# Patient Record
Sex: MALE | Race: WHITE | Employment: FULL TIME | URBAN - METROPOLITAN AREA
[De-identification: names, ages, dates, MRNs, and addresses within clinical notes are randomized per-mention and may not be internally consistent; named-entity substitution may affect disease eponyms.]

---

## 2018-09-26 ENCOUNTER — HOSPITAL ENCOUNTER (OUTPATIENT)
Dept: CT IMAGING | Age: 38
Discharge: HOME | End: 2018-09-26
Attending: NURSE PRACTITIONER
Payer: COMMERCIAL

## 2018-09-26 DIAGNOSIS — R35.0 URINARY FREQUENCY: ICD-10-CM

## 2018-09-26 DIAGNOSIS — R10.9 ACUTE LEFT FLANK PAIN: ICD-10-CM

## 2018-09-26 DIAGNOSIS — R31.29 OTHER MICROSCOPIC HEMATURIA: ICD-10-CM

## 2018-09-26 DIAGNOSIS — R82.998 DARK URINE: ICD-10-CM

## 2018-09-26 DIAGNOSIS — R10.32 LEFT LOWER QUADRANT PAIN: ICD-10-CM

## 2018-09-26 PROCEDURE — 74176 CT ABD & PELVIS W/O CONTRAST: CPT

## 2018-09-28 PROBLEM — N21.9 CALCULUS OF LOWER URINARY TRACT: Status: ACTIVE | Noted: 2018-09-28

## 2019-10-28 PROBLEM — J30.1 SEASONAL ALLERGIC RHINITIS DUE TO POLLEN: Status: ACTIVE | Noted: 2019-10-28

## 2019-10-28 PROBLEM — F41.1 GAD (GENERALIZED ANXIETY DISORDER): Status: ACTIVE | Noted: 2019-10-28

## 2019-10-28 PROBLEM — J30.89 ENVIRONMENTAL AND SEASONAL ALLERGIES: Status: ACTIVE | Noted: 2019-10-28

## 2020-04-20 PROBLEM — E78.2 MIXED HYPERLIPIDEMIA: Status: ACTIVE | Noted: 2020-04-20

## 2020-09-18 PROBLEM — Z23 ENCOUNTER FOR IMMUNIZATION: Status: ACTIVE | Noted: 2020-09-18

## 2020-09-18 PROBLEM — E66.3 OVERWEIGHT: Status: ACTIVE | Noted: 2020-09-18

## 2020-10-18 PROBLEM — Z23 ENCOUNTER FOR IMMUNIZATION: Status: RESOLVED | Noted: 2020-09-18 | Resolved: 2020-10-18

## 2021-10-11 PROBLEM — N52.01 ERECTILE DYSFUNCTION DUE TO ARTERIAL INSUFFICIENCY: Status: ACTIVE | Noted: 2021-10-11

## 2022-03-18 PROBLEM — E78.2 MIXED HYPERLIPIDEMIA: Status: ACTIVE | Noted: 2020-04-20

## 2022-03-18 PROBLEM — N52.01 ERECTILE DYSFUNCTION DUE TO ARTERIAL INSUFFICIENCY: Status: ACTIVE | Noted: 2021-10-11

## 2022-03-18 PROBLEM — F41.1 GAD (GENERALIZED ANXIETY DISORDER): Status: ACTIVE | Noted: 2019-10-28

## 2022-03-18 PROBLEM — E66.3 OVERWEIGHT: Status: ACTIVE | Noted: 2020-09-18

## 2022-03-19 PROBLEM — N21.9 CALCULUS OF LOWER URINARY TRACT: Status: ACTIVE | Noted: 2018-09-28

## 2022-03-19 PROBLEM — J30.89 ENVIRONMENTAL AND SEASONAL ALLERGIES: Status: ACTIVE | Noted: 2019-10-28

## 2022-03-19 PROBLEM — J30.1 SEASONAL ALLERGIC RHINITIS DUE TO POLLEN: Status: ACTIVE | Noted: 2019-10-28

## 2022-06-01 ENCOUNTER — TELEMEDICINE (OUTPATIENT)
Dept: FAMILY MEDICINE CLINIC | Facility: CLINIC | Age: 42
End: 2022-06-01
Payer: COMMERCIAL

## 2022-06-01 DIAGNOSIS — U07.1 COVID-19: Primary | ICD-10-CM

## 2022-06-01 PROCEDURE — 99213 OFFICE O/P EST LOW 20 MIN: CPT | Performed by: FAMILY MEDICINE

## 2022-06-01 RX ORDER — DOXYCYCLINE HYCLATE 100 MG
100 TABLET ORAL 2 TIMES DAILY
Qty: 14 TABLET | Refills: 0 | Status: SHIPPED | OUTPATIENT
Start: 2022-06-01 | End: 2022-06-08

## 2022-06-01 RX ORDER — DEXAMETHASONE 6 MG/1
6 TABLET ORAL
Qty: 6 TABLET | Refills: 0 | Status: SHIPPED | OUTPATIENT
Start: 2022-06-01 | End: 2022-06-07

## 2022-06-01 RX ORDER — ALBUTEROL SULFATE 90 UG/1
2 AEROSOL, METERED RESPIRATORY (INHALATION) 4 TIMES DAILY PRN
Qty: 18 G | Refills: 1 | Status: SHIPPED | OUTPATIENT
Start: 2022-06-01

## 2022-06-01 ASSESSMENT — ENCOUNTER SYMPTOMS
RHINORRHEA: 1
STRIDOR: 0
CHEST TIGHTNESS: 1
EYE REDNESS: 0
EYE PAIN: 0
SINUS PAIN: 0
BACK PAIN: 0
EYE ITCHING: 0
VOMITING: 0
SHORTNESS OF BREATH: 0
ABDOMINAL PAIN: 0
COUGH: 1
SINUS PRESSURE: 0
DIARRHEA: 0
WHEEZING: 0
ABDOMINAL DISTENTION: 0
COLOR CHANGE: 0
NAUSEA: 0
CONSTIPATION: 0
EYE DISCHARGE: 0
BLOOD IN STOOL: 0
FACIAL SWELLING: 0
SORE THROAT: 0

## 2022-06-01 NOTE — PROGRESS NOTES
Clare Otero is a 42 y.o. male who was seen by synchronous (real-time) audio-video technology on 6/1/2022 for No chief complaint on file. Subjective:   Mr. Christine Chamberlain is a 41 y/o M that c/o 3 day h/o productive cough with mildly purulent sputum, burning in chest with breathing, sneezing, clear rhinorrhea, loss of taste, fatigue. He took at home covid test last night that was positive. He has alerted his co-workers that he interacted with yesterday. He denies any significant SOB, fever/chills. Prior to Admission medications    Medication Sig Start Date End Date Taking? Authorizing Provider   dexamethasone (DECADRON) 6 MG tablet Take 1 tablet by mouth daily (with breakfast) for 6 days 6/1/22 6/7/22 Yes Marychuy Handy III, MD   doxycycline hyclate (VIBRA-TABS) 100 MG tablet Take 1 tablet by mouth 2 times daily for 7 days 6/1/22 6/8/22 Yes Marychuy Handy III, MD   albuterol sulfate HFA (VENTOLIN HFA) 108 (90 Base) MCG/ACT inhaler Inhale 2 puffs into the lungs 4 times daily as needed for Wheezing 6/1/22  Yes Debora Downey III, MD   buPROPion (WELLBUTRIN XL) 300 MG extended release tablet Take 300 mg by mouth 10/11/21   Ar Automatic Reconciliation   candesartan (ATACAND) 16 MG tablet Take 16 mg by mouth daily 10/11/21   Ar Automatic Reconciliation   fluticasone (FLONASE) 50 MCG/ACT nasal spray USE 1 SPRAY IN BOTH NOSTRILS ONCE A DAY 10/11/21   Ar Automatic Reconciliation   sildenafil (VIAGRA) 25 MG tablet Take 25 mg by mouth as needed 10/11/21   Ar Automatic Reconciliation         Review of Systems   Constitutional: Positive for fatigue. Negative for activity change, appetite change, chills, diaphoresis, fever and unexpected weight change. HENT: Positive for rhinorrhea. Negative for congestion, ear discharge, ear pain, facial swelling, hearing loss, mouth sores, nosebleeds, postnasal drip, sinus pressure, sinus pain, sore throat and tinnitus.     Eyes: Negative for pain, discharge, redness, itching and visual disturbance. Respiratory: Positive for cough and chest tightness. Negative for shortness of breath, wheezing and stridor. Cardiovascular: Negative for chest pain, palpitations and leg swelling. Gastrointestinal: Negative for abdominal distention, abdominal pain, blood in stool, constipation, diarrhea, nausea and vomiting. Endocrine: Negative for cold intolerance, heat intolerance, polydipsia, polyphagia and polyuria. Genitourinary: Negative for decreased urine volume, difficulty urinating, dysuria, flank pain, frequency, hematuria and urgency. Musculoskeletal: Negative for arthralgias, back pain, gait problem, joint swelling, myalgias and neck pain. Skin: Negative for color change, pallor, rash and wound. Neurological: Negative for dizziness, tremors, seizures, syncope, facial asymmetry, speech difficulty, weakness, light-headedness, numbness and headaches. Psychiatric/Behavioral: Negative for agitation, behavioral problems, confusion, hallucinations, self-injury, sleep disturbance and suicidal ideas. The patient is not nervous/anxious. Objective:   No flowsheet data found.      [INSTRUCTIONS:  \"[x]\" Indicates a positive item  \"[]\" Indicates a negative item  -- DELETE ALL ITEMS NOT EXAMINED]    Constitutional: [x] Appears well-developed and well-nourished [x] No apparent distress      [] Abnormal -     Mental status: [x] Alert and awake  [x] Oriented to person/place/time [x] Able to follow commands    [] Abnormal -     Eyes:   EOM    [x]  Normal    [] Abnormal -   Sclera  [x]  Normal    [] Abnormal -          Discharge [x]  None visible   [] Abnormal -     HENT: [x] Normocephalic, atraumatic  [] Abnormal -       External Ears [x] Normal  [] Abnormal -    Neck: [x] No visualized mass [] Abnormal -     Pulmonary/Chest: [x] Respiratory effort normal   [x] No visualized signs of difficulty breathing or respiratory distress        [] Abnormal -      Musculoskeletal:   [] Normal gait with no signs of ataxia         [x] Normal range of motion of neck        [] Abnormal -     Neurological:        [x] No Facial Asymmetry (Cranial nerve 7 motor function) (limited exam due to video visit)          [x] No gaze palsy        [] Abnormal -          Skin:        [x] No significant exanthematous lesions or discoloration noted on facial skin         [] Abnormal -            Psychiatric:       [x] Normal Affect [] Abnormal -        [x] No Hallucinations    Other pertinent observable physical exam findings:-    Diagnoses and all orders for this visit:    COVID-19  -     dexamethasone (DECADRON) 6 MG tablet; Take 1 tablet by mouth daily (with breakfast) for 6 days  -     doxycycline hyclate (VIBRA-TABS) 100 MG tablet; Take 1 tablet by mouth 2 times daily for 7 days  -     albuterol sulfate HFA (VENTOLIN HFA) 108 (90 Base) MCG/ACT inhaler; Inhale 2 puffs into the lungs 4 times daily as needed for Wheezing           COVID-19   Recently recovered from sinusitis-finished Z-josefina. Will start doxy + dexamethasone + albuterol given pulmonary symptoms. Encouraged to quarantine for 5 days since s/s onset and no fever for 24 hrs without antipyretics; isolate from negative household contacts and wear mask even in home if pt must be in same room with negative contact, increase oral fluids; rest; notify all recent contacts of symptoms and need to quarantine and test; take any rx'd medications as rx'd; notify office with any SOB or if severe, go to ER. Encouraged to start Vit D3 5000 I/U daily if not already receiving supplementation, Vit C 2000 mg and Zince 100 mg daily x 10 days. We discussed the expected course, resolution and complications of the diagnosis(es) in detail. Medication risks, benefits, costs, interactions, and alternatives were discussed as indicated. I advised him to contact the office if his condition worsens, changes or fails to improve as anticipated.  He expressed understanding with the diagnosis(es) and plan. Iris Flores, was evaluated through a synchronous (real-time) audio-video encounter. The patient (or guardian if applicable) is aware that this is a billable service. Verbal consent to proceed has been obtained within the past 12 months. The visit was conducted pursuant to the emergency declaration under the [address] [place] [address] authority and the [employer] and [employer] General Act. Patient identification was verified, and a caregiver was present when appropriate. The patient was located in a state where the provider was credentialed to provide care. This visit was completely virtually using doxy. edward Li MD

## 2022-06-01 NOTE — ASSESSMENT & PLAN NOTE
Recently recovered from sinusitis-finished Z-josefina. Will start doxy + dexamethasone + albuterol given pulmonary symptoms. Encouraged to quarantine for 5 days since s/s onset and no fever for 24 hrs without antipyretics; isolate from negative household contacts and wear mask even in home if pt must be in same room with negative contact, increase oral fluids; rest; notify all recent contacts of symptoms and need to quarantine and test; take any rx'd medications as rx'd; notify office with any SOB or if severe, go to ER. Encouraged to start Vit D3 5000 I/U daily if not already receiving supplementation, Vit C 2000 mg and Zince 100 mg daily x 10 days.

## 2022-06-06 ENCOUNTER — TELEPHONE (OUTPATIENT)
Dept: FAMILY MEDICINE CLINIC | Facility: CLINIC | Age: 42
End: 2022-06-06

## 2022-06-06 NOTE — TELEPHONE ENCOUNTER
Please advise regarding pt's mychart message: \"Hello,     I have a couple of questions in regards to the Covid case I have snd  work note. Cely Arcos started Saturday doing some laundry just to do something and yesterday walked around the grocery store yesterday for about an 45 min to get my stamina up and once I was done with that I was worth nothing all day.  My o2 dropped to 91 and became congested around 6pm I then took my inhaler.  I rested for the rest of the night and o2 improved.  Today I have been resting and feel sore from walking yesterday. What I have seen is I get more congested in the evenings and I take one puff of the inhaler.  My other concern is I work for [employer] as an  and I walk daily 5 plus miles. Currently I supervise the outdoor garden area and there are limited sit down activities to do in that role. Would be it be the best to increase my walking every other day and a return to work date?      Thanks    Jennifer Herron \"

## 2022-06-07 ENCOUNTER — OFFICE VISIT (OUTPATIENT)
Dept: FAMILY MEDICINE CLINIC | Facility: CLINIC | Age: 42
End: 2022-06-07
Payer: COMMERCIAL

## 2022-06-07 VITALS
WEIGHT: 208 LBS | OXYGEN SATURATION: 98 % | HEIGHT: 71 IN | BODY MASS INDEX: 29.12 KG/M2 | DIASTOLIC BLOOD PRESSURE: 78 MMHG | SYSTOLIC BLOOD PRESSURE: 110 MMHG

## 2022-06-07 DIAGNOSIS — R06.02 SHORTNESS OF BREATH: ICD-10-CM

## 2022-06-07 DIAGNOSIS — U07.1 COVID-19: Primary | ICD-10-CM

## 2022-06-07 PROCEDURE — 99214 OFFICE O/P EST MOD 30 MIN: CPT | Performed by: FAMILY MEDICINE

## 2022-06-07 ASSESSMENT — ENCOUNTER SYMPTOMS
BACK PAIN: 0
SINUS PAIN: 0
COUGH: 1
EYE ITCHING: 0
ABDOMINAL PAIN: 0
BLOOD IN STOOL: 0
EYE DISCHARGE: 0
COLOR CHANGE: 0
FACIAL SWELLING: 0
STRIDOR: 0
RHINORRHEA: 0
CONSTIPATION: 0
SHORTNESS OF BREATH: 1
DIARRHEA: 0
SORE THROAT: 0
NAUSEA: 0
ABDOMINAL DISTENTION: 0
SINUS PRESSURE: 0
EYE REDNESS: 0
WHEEZING: 0
EYE PAIN: 0
CHEST TIGHTNESS: 0
VOMITING: 0

## 2022-06-07 NOTE — PROGRESS NOTES
[address] [place] [address]  _______________________________________  Jeffrey Swain MD                 [address] [place] [address],  [address]. [place], [place]                     [address]                                                                                    Phone: [phone]                                                                                    Fax: [phone]    Devi Gonzalez. is a 42 y.o. male who is seen for evaluation of   Chief Complaint   Patient presents with    Shortness of Breath     Pt tested positive for covid 5/31 and still has shortness of breath, fatigue, and a cough that make walking around the house difficult. He is concerned about going back to work and being able to do all of his work duties. He works at [employer] and walks around 15 miles a day. HPI:   Pt is seen today for JUSTIN and SOB upon laying down in bed at night, that started since his positive diagnosis of covid on 5/31. Pt continues to have fatigue, and a mostly dry cough with occasional clear sputum production. He has noticed these s/s while walking in his home. He denies any dizziness, CP, productive cough. Pt was given albuterol inhaler at the time of diagnosis, but has only used it prior to bed, which improved his SOB. He is concerned about going back to work and being able to do all of his work duties. He works at [employer] and walks around 15 miles a day. Review of Systems:  Review of Systems   Constitutional: Positive for fatigue. Negative for activity change, appetite change, chills, diaphoresis, fever and unexpected weight change. HENT: Negative for congestion, ear discharge, ear pain, facial swelling, hearing loss, mouth sores, nosebleeds, postnasal drip, rhinorrhea, sinus pressure, sinus pain, sore throat and tinnitus. Eyes: Negative for pain, discharge, redness, itching and visual disturbance. Respiratory: Positive for cough and shortness of breath.  Negative for chest tightness, wheezing and stridor. Cardiovascular: Negative for chest pain, palpitations and leg swelling. Gastrointestinal: Negative for abdominal distention, abdominal pain, blood in stool, constipation, diarrhea, nausea and vomiting. Endocrine: Negative for cold intolerance, heat intolerance, polydipsia, polyphagia and polyuria. Genitourinary: Negative for decreased urine volume, difficulty urinating, dysuria, flank pain, frequency, hematuria and urgency. Musculoskeletal: Negative for arthralgias, back pain, gait problem, joint swelling, myalgias and neck pain. Skin: Negative for color change, pallor, rash and wound. Neurological: Negative for dizziness, tremors, seizures, syncope, facial asymmetry, speech difficulty, weakness, light-headedness, numbness and headaches. Psychiatric/Behavioral: Negative for agitation, behavioral problems, confusion, hallucinations, self-injury, sleep disturbance and suicidal ideas. The patient is not nervous/anxious. History:  Past Medical History:   Diagnosis Date    JAZIEL (generalized anxiety disorder)     History of pneumonia     left upper lobe    Hypertension     Kidney stone        History reviewed. No pertinent surgical history. Family History   Problem Relation Age of Onset    Hypertension Father     Hypertension Paternal Grandfather     Heart Attack Paternal Grandfather 64    Depression Father     High Cholesterol Father     Alcohol Abuse Mother        Social History     Tobacco Use    Smoking status: Never Smoker    Smokeless tobacco: Former User   Substance Use Topics    Alcohol use:  Yes     Alcohol/week: 1.0 standard drink       Allergies   Allergen Reactions    Codeine Nausea And Vomiting       Current Outpatient Medications   Medication Sig Dispense Refill    dexamethasone (DECADRON) 6 MG tablet Take 1 tablet by mouth daily (with breakfast) for 6 days 6 tablet 0    doxycycline hyclate (VIBRA-TABS) 100 MG tablet Take 1 tablet by mouth 2 times daily for 7 days 14 tablet 0    albuterol sulfate HFA (VENTOLIN HFA) 108 (90 Base) MCG/ACT inhaler Inhale 2 puffs into the lungs 4 times daily as needed for Wheezing 18 g 1    buPROPion (WELLBUTRIN XL) 300 MG extended release tablet Take 300 mg by mouth      candesartan (ATACAND) 16 MG tablet Take 16 mg by mouth daily      fluticasone (FLONASE) 50 MCG/ACT nasal spray USE 1 SPRAY IN BOTH NOSTRILS ONCE A DAY      sildenafil (VIAGRA) 25 MG tablet Take 25 mg by mouth as needed       No current facility-administered medications for this visit. Vitals:    /78 (Site: Left Upper Arm, Position: Sitting, Cuff Size: Large Adult)   Ht 5' 11\" (1.803 m)   Wt 208 lb (94.3 kg)   SpO2 98%   BMI 29.01 kg/m²     Physical Exam:  Physical Exam  Vitals reviewed. Constitutional:       General: He is not in acute distress. Appearance: Normal appearance. He is not ill-appearing. HENT:      Head: Normocephalic and atraumatic. Right Ear: Tympanic membrane and ear canal normal. There is no impacted cerumen. Left Ear: Tympanic membrane and ear canal normal. There is no impacted cerumen. Nose: No congestion or rhinorrhea. Mouth/Throat:      Mouth: Mucous membranes are moist.      Pharynx: No oropharyngeal exudate or posterior oropharyngeal erythema. Eyes:      General: No scleral icterus. Right eye: No discharge. Left eye: No discharge. Extraocular Movements: Extraocular movements intact. Conjunctiva/sclera: Conjunctivae normal.      Pupils: Pupils are equal, round, and reactive to light. Cardiovascular:      Rate and Rhythm: Normal rate and regular rhythm. Pulses: Normal pulses. Heart sounds: No murmur heard. No friction rub. No gallop. Pulmonary:      Effort: Pulmonary effort is normal. No respiratory distress. Breath sounds: No stridor. No wheezing, rhonchi or rales. Chest:      Chest wall: No tenderness.    Abdominal:      General: Abdomen is flat. Bowel sounds are normal. There is no distension. Palpations: Abdomen is soft. There is no mass. Tenderness: There is no abdominal tenderness. There is no right CVA tenderness or left CVA tenderness. Musculoskeletal:         General: No tenderness. Normal range of motion. Cervical back: Normal range of motion and neck supple. No rigidity or tenderness. Right lower leg: No edema. Left lower leg: No edema. Lymphadenopathy:      Cervical: No cervical adenopathy. Skin:     General: Skin is warm and dry. Capillary Refill: Capillary refill takes less than 2 seconds. Findings: No lesion or rash. Neurological:      General: No focal deficit present. Mental Status: He is alert and oriented to person, place, and time. Mental status is at baseline. Sensory: No sensory deficit. Motor: No weakness. Coordination: Coordination normal.      Gait: Gait normal.   Psychiatric:         Mood and Affect: Mood normal.         Behavior: Behavior normal.         Thought Content: Thought content normal.         Judgment: Judgment normal.         Assessment/Plan:     ICD-10-CM    1. COVID-19  U07.1    2. Shortness of breath  R06.02         Problem List        Other    COVID-19 - Primary     Reassurance given that his s/s are consistent with covid-19 and may continue for several weeks after initial recovery from the virus. He has completed his course of dexamethasone and will be finishing abx today. Encouraged to use q 4 hrs prn and not just at bedtime. May consider adding inhaled corticosteroid if this does not improve over the next 7 days. Letter completed to extend work absence for an additional 7 days. Pts have show quite remarkable improvement in pulmonary endurance with post-covid pulmonary rehab. May consider if no improvement in 7 days.           Relevant Medications    dexamethasone (DECADRON) 6 MG tablet    doxycycline hyclate (VIBRA-TABS) 100 MG tablet albuterol sulfate HFA (VENTOLIN HFA) 108 (90 Base) MCG/ACT inhaler         I spent >30 minutes preparing to see patient (including chart review and preparation), obtaining and/or reviewing additional medical history, performing a physical exam and evaluation, documenting clinical information in the electronic health record, independently interpreting results, communicating results to patient, family or caregiver, and/or coordinating care.     Crystal Machado MD

## 2022-06-07 NOTE — LETTER
NOTIFICATION RETURN TO WORK / SCHOOL    6/7/2022    Mr. Ludin Blackmon. [address] [place]      To Whom It May Concern:    Ludin Blackmon. was tested for COVID-19 on 5/31, and the result was positive. He may return to work on 6/14. I recommend:return without restrictions    If there are questions or concerns, please have the patient contact our office.         Sincerely,      Escobar Souza MD

## 2022-06-08 NOTE — ASSESSMENT & PLAN NOTE
Reassurance given that his s/s are consistent with covid-19 and may continue for several weeks after initial recovery from the virus. He has completed his course of dexamethasone and will be finishing abx today. Encouraged to use q 4 hrs prn and not just at bedtime. May consider adding inhaled corticosteroid if this does not improve over the next 7 days. Letter completed to extend work absence for an additional 7 days. Pts have show quite remarkable improvement in pulmonary endurance with post-covid pulmonary rehab. May consider if no improvement in 7 days.

## 2022-09-12 ENCOUNTER — TELEMEDICINE (OUTPATIENT)
Dept: FAMILY MEDICINE CLINIC | Facility: CLINIC | Age: 42
End: 2022-09-12
Payer: COMMERCIAL

## 2022-09-12 DIAGNOSIS — F41.1 GAD (GENERALIZED ANXIETY DISORDER): ICD-10-CM

## 2022-09-12 DIAGNOSIS — E66.3 OVERWEIGHT: ICD-10-CM

## 2022-09-12 DIAGNOSIS — Z11.59 ENCOUNTER FOR HEPATITIS C SCREENING TEST FOR LOW RISK PATIENT: ICD-10-CM

## 2022-09-12 DIAGNOSIS — Z11.4 ENCOUNTER FOR SCREENING FOR HIV: ICD-10-CM

## 2022-09-12 DIAGNOSIS — E78.2 MIXED HYPERLIPIDEMIA: ICD-10-CM

## 2022-09-12 DIAGNOSIS — J30.1 SEASONAL ALLERGIC RHINITIS DUE TO POLLEN: Primary | ICD-10-CM

## 2022-09-12 DIAGNOSIS — N52.01 ERECTILE DYSFUNCTION DUE TO ARTERIAL INSUFFICIENCY: ICD-10-CM

## 2022-09-12 DIAGNOSIS — I10 ESSENTIAL HYPERTENSION: ICD-10-CM

## 2022-09-12 PROCEDURE — 99214 OFFICE O/P EST MOD 30 MIN: CPT | Performed by: FAMILY MEDICINE

## 2022-09-12 RX ORDER — TADALAFIL 5 MG/1
5 TABLET ORAL PRN
Qty: 10 TABLET | Refills: 5 | Status: SHIPPED | OUTPATIENT
Start: 2022-09-12

## 2022-09-12 RX ORDER — FLUTICASONE PROPIONATE 50 MCG
2 SPRAY, SUSPENSION (ML) NASAL DAILY
Qty: 16 G | Refills: 5 | Status: SHIPPED | OUTPATIENT
Start: 2022-09-12

## 2022-09-12 RX ORDER — CANDESARTAN 16 MG/1
16 TABLET ORAL DAILY
Qty: 90 TABLET | Refills: 1 | Status: SHIPPED | OUTPATIENT
Start: 2022-09-12

## 2022-09-12 RX ORDER — BUPROPION HYDROCHLORIDE 300 MG/1
300 TABLET ORAL EVERY MORNING
Qty: 90 TABLET | Refills: 1 | Status: SHIPPED | OUTPATIENT
Start: 2022-09-12

## 2022-09-12 ASSESSMENT — ENCOUNTER SYMPTOMS
WHEEZING: 0
ABDOMINAL PAIN: 0
NAUSEA: 0
SORE THROAT: 0
SHORTNESS OF BREATH: 0
SINUS PAIN: 0
COUGH: 0
VOMITING: 0
DIARRHEA: 0

## 2022-09-12 NOTE — PROGRESS NOTES
Dereje Vines (:  1980) is a 36 y.o. male,Established patient, here for evaluation of the following chief complaint(s):  Medication Refill         ASSESSMENT/PLAN:  1. Seasonal allergic rhinitis due to pollen  -     fluticasone (FLONASE) 50 MCG/ACT nasal spray; 2 sprays by Nasal route daily USE 1 SPRAY IN BOTH NOSTRILS ONCE A DAY, Disp-16 g, R-5Normal  2. Mixed hyperlipidemia  -     Comprehensive Metabolic Panel; Future  -     Lipid Panel; Future  3. Erectile dysfunction due to arterial insufficiency  -     tadalafil (CIALIS) 5 MG tablet; Take 1 tablet by mouth as needed for Erectile Dysfunction, Disp-10 tablet, R-5Normal  4. Essential hypertension  -     CBC with Auto Differential; Future  -     Comprehensive Metabolic Panel; Future  -     candesartan (ATACAND) 16 MG tablet; Take 1 tablet by mouth daily, Disp-90 tablet, R-1Normal  5. JAZIEL (generalized anxiety disorder)  -     buPROPion (WELLBUTRIN XL) 300 MG extended release tablet; Take 1 tablet by mouth every morning, Disp-90 tablet, R-1Normal  6. Overweight  7. Encounter for screening for HIV  -     HIV 1/2 Ag/Ab, 4TH Generation,W Rflx Confirm; Future  8. Encounter for hepatitis C screening test for low risk patient  -     Hepatitis C Antibody; Future    Await labs, advised to continue to avoid salt and fatty foods, to check BP and keep a log. Allergies are well managed, to take Zyrtec and use Flonase NS daily when symptoms worsen. DC Sildenafil due to side effects, start Cialis 5 mg- discussed when to take and possible side effects. Advised patient to lose weight. Also advised to exercise regularly, to eat healthy foods, and to control portion sizes. Return for 2 days- fasting labs; 6 mths- , medication refills, fasting labs or prn sooner.          Subjective   SUBJECTIVE/OBJECTIVE:  HPI  Virtual visit for follow up and medication refills, denies any side effects or associated symptoms-     HTN- was not good but has cut back on salt now, Negative for abdominal pain, diarrhea, nausea and vomiting. Psychiatric/Behavioral:  Positive for agitation. Negative for behavioral problems, confusion, dysphoric mood, self-injury, sleep disturbance and suicidal ideas. The patient is nervous/anxious. Objective   Physical Exam     Vital Signs: (As obtained by patient/caregiver at home)  There were no vitals taken for this visit    Constitutional - appears well-developed and well-nourished, no apparent distress  Mental status - alert and awake, able to follow commands  Eyes - sclera normal, no discharge visible bilaterally  Head - normocephalic atraumatic  ENT - mouth throat-mucous membranes are moist; external ears are normal  Neck - no visualized mass  Pulmonary/chest - respiratory effort is normal, no visualized respiratory distress  Psychiatric - normal mood and affect, happy        An electronic signature was used to authenticate this note.     --Valeria Medrano MD

## 2022-12-03 ENCOUNTER — APPOINTMENT (OUTPATIENT)
Dept: GENERAL RADIOLOGY | Age: 42
End: 2022-12-03
Payer: COMMERCIAL

## 2022-12-03 ENCOUNTER — HOSPITAL ENCOUNTER (EMERGENCY)
Age: 42
Discharge: HOME | End: 2022-12-03
Attending: EMERGENCY MEDICINE
Payer: COMMERCIAL

## 2022-12-03 VITALS
BODY MASS INDEX: 29.4 KG/M2 | OXYGEN SATURATION: 95 % | HEART RATE: 95 BPM | DIASTOLIC BLOOD PRESSURE: 89 MMHG | RESPIRATION RATE: 16 BRPM | WEIGHT: 210 LBS | TEMPERATURE: 98.7 F | SYSTOLIC BLOOD PRESSURE: 140 MMHG | HEIGHT: 71 IN

## 2022-12-03 DIAGNOSIS — Z86.79 HISTORY OF HYPERTENSION: ICD-10-CM

## 2022-12-03 DIAGNOSIS — R42 LIGHTHEADEDNESS: Primary | ICD-10-CM

## 2022-12-03 LAB
ALBUMIN SERPL-MCNC: 4.6 G/DL (ref 3.5–5)
ALBUMIN/GLOB SERPL: 1.3 {RATIO} (ref 0.4–1.6)
ALP SERPL-CCNC: 106 U/L (ref 45–117)
ALT SERPL-CCNC: 41 U/L (ref 13–61)
ANION GAP SERPL CALC-SCNC: 10 MMOL/L (ref 2–11)
AST SERPL-CCNC: 29 U/L (ref 15–37)
BASOPHILS # BLD: 0 K/UL (ref 0–0.2)
BASOPHILS NFR BLD: 1 % (ref 0–2)
BILIRUB SERPL-MCNC: 0.2 MG/DL (ref 0.2–1.1)
BUN SERPL-MCNC: 16 MG/DL (ref 7–18)
CALCIUM SERPL-MCNC: 10.1 MG/DL (ref 8.3–10.4)
CHLORIDE SERPL-SCNC: 101 MMOL/L (ref 98–107)
CO2 SERPL-SCNC: 27 MMOL/L (ref 21–32)
CREAT SERPL-MCNC: 1.22 MG/DL (ref 0.8–1.5)
DIFFERENTIAL METHOD BLD: ABNORMAL
EOSINOPHIL # BLD: 0.1 K/UL (ref 0–0.8)
EOSINOPHIL NFR BLD: 1 % (ref 0.5–7.8)
ERYTHROCYTE [DISTWIDTH] IN BLOOD BY AUTOMATED COUNT: 12.8 % (ref 11.9–14.6)
GLOBULIN SER CALC-MCNC: 3.6 G/DL (ref 2.8–4.5)
GLUCOSE SERPL-MCNC: 100 MG/DL (ref 65–100)
HCT VFR BLD AUTO: 40.9 % (ref 41.1–50.3)
HGB BLD-MCNC: 13.6 G/DL (ref 13.6–17.2)
IMM GRANULOCYTES # BLD AUTO: 0 K/UL (ref 0–0.5)
IMM GRANULOCYTES NFR BLD AUTO: 0 % (ref 0–5)
LYMPHOCYTES # BLD: 3 K/UL (ref 0.5–4.6)
LYMPHOCYTES NFR BLD: 34 % (ref 13–44)
MAGNESIUM SERPL-MCNC: 1.8 MG/DL (ref 1.2–2.6)
MCH RBC QN AUTO: 28.9 PG (ref 26.1–32.9)
MCHC RBC AUTO-ENTMCNC: 33.3 G/DL (ref 31.4–35)
MCV RBC AUTO: 87 FL (ref 82–102)
MONOCYTES # BLD: 0.8 K/UL (ref 0.1–1.3)
MONOCYTES NFR BLD: 9 % (ref 4–12)
NEUTS SEG # BLD: 4.9 K/UL (ref 1.7–8.2)
NEUTS SEG NFR BLD: 55 % (ref 43–78)
NRBC # BLD: 0 K/UL (ref 0–0.2)
PLATELET # BLD AUTO: 372 K/UL (ref 150–450)
PMV BLD AUTO: 9.1 FL (ref 9.4–12.3)
POTASSIUM SERPL-SCNC: 4.2 MMOL/L (ref 3.5–5.1)
PROT SERPL-MCNC: 8.2 G/DL (ref 6.4–8.2)
RBC # BLD AUTO: 4.7 M/UL (ref 4.23–5.6)
SODIUM SERPL-SCNC: 138 MMOL/L (ref 133–143)
TROPONIN T SERPL HS-MCNC: <6 NG/L (ref 0–22)
WBC # BLD AUTO: 8.8 K/UL (ref 4.3–11.1)

## 2022-12-03 PROCEDURE — 83735 ASSAY OF MAGNESIUM: CPT

## 2022-12-03 PROCEDURE — 84484 ASSAY OF TROPONIN QUANT: CPT

## 2022-12-03 PROCEDURE — 85025 COMPLETE CBC W/AUTO DIFF WBC: CPT

## 2022-12-03 PROCEDURE — 80053 COMPREHEN METABOLIC PANEL: CPT

## 2022-12-03 PROCEDURE — 99285 EMERGENCY DEPT VISIT HI MDM: CPT

## 2022-12-03 PROCEDURE — 71045 X-RAY EXAM CHEST 1 VIEW: CPT

## 2022-12-03 ASSESSMENT — ENCOUNTER SYMPTOMS
ANAL BLEEDING: 0
VISUAL CHANGE: 0
BACK PAIN: 0
ABDOMINAL PAIN: 0
VOMITING: 0
PHOTOPHOBIA: 0
CHOKING: 0
VOICE CHANGE: 0
COLOR CHANGE: 0
EYE REDNESS: 0

## 2022-12-03 ASSESSMENT — PAIN - FUNCTIONAL ASSESSMENT: PAIN_FUNCTIONAL_ASSESSMENT: 0-10

## 2022-12-03 ASSESSMENT — PAIN DESCRIPTION - LOCATION: LOCATION: HEAD

## 2022-12-03 ASSESSMENT — PAIN SCALES - GENERAL
PAINLEVEL_OUTOF10: 2
PAINLEVEL_OUTOF10: 0

## 2022-12-03 ASSESSMENT — PAIN DESCRIPTION - ONSET: ONSET: PROGRESSIVE

## 2022-12-03 ASSESSMENT — LIFESTYLE VARIABLES
HOW MANY STANDARD DRINKS CONTAINING ALCOHOL DO YOU HAVE ON A TYPICAL DAY: PATIENT DOES NOT DRINK
HOW OFTEN DO YOU HAVE A DRINK CONTAINING ALCOHOL: NEVER

## 2022-12-03 ASSESSMENT — PAIN DESCRIPTION - PAIN TYPE: TYPE: ACUTE PAIN

## 2022-12-03 ASSESSMENT — PAIN DESCRIPTION - DESCRIPTORS: DESCRIPTORS: PRESSURE

## 2022-12-03 ASSESSMENT — PAIN DESCRIPTION - FREQUENCY: FREQUENCY: CONTINUOUS

## 2022-12-04 NOTE — ED PROVIDER NOTES
Emergency Department Provider Note                   PCP:                Mable Wagoner MD               Age: 36 y.o. Sex: male     No diagnosis found. DISPOSITION          MDM  Number of Diagnoses or Management Options  Diagnosis management comments: Fairly well-appearing here. Plan for screening labs EKG as well as chest x-ray. Given his chest pain was yesterday feel that 1 set of cardiac enzymes is appropriate    9:37 PM  Laboratory data here is stable. Patient does still report some mild headache. Vital signs remained stable. He is not orthostatic. I feel he stable for discharge. Encourage close PCP follow-up. Amount and/or Complexity of Data Reviewed  Clinical lab tests: ordered and reviewed  Tests in the radiology section of CPT®: reviewed and ordered  Review and summarize past medical records: yes  Independent visualization of images, tracings, or specimens: yes (EKG interpretation: Normal sinus rhythm, rate of 98, normal axis, no blocks, no ST segment elevation or depression noted)    Risk of Complications, Morbidity, and/or Mortality  Presenting problems: moderate  Diagnostic procedures: moderate  Management options: moderate               Orders Placed This Encounter   Procedures    XR CHEST PORTABLE    CBC with Auto Differential    CMP    Magnesium    Troponin T    EKG 12 Lead        Medications - No data to display    New Prescriptions    No medications on file        Freida Turner is a 36 y.o. male who presents to the Emergency Department with chief complaint of    Chief Complaint   Patient presents with    Dizziness      Patient presents the ER with complaints of dizziness, lightheadedness, chest discomfort. Patient states yesterday he had some sharp discomfort around the left side of his chest.  States this lasted for couple minutes and then resolved. Today he has had episodes where he feels very dizzy and lightheaded as if he may passed out.   States he has had 3 of these episodes. Most have come with sitting. States he has had some headache as well. Denies any fevers, chills, neck pain or neck stiffness. Denies any vomiting or diaphoresis. Is here with his spouse who is concerned that he has had a blood pressure today that was 149/50 she felt this diastolic pressure was very low. The history is provided by the patient. Dizziness  Quality:  Lightheadedness  Severity:  Moderate  Onset quality:  Gradual  Duration:  6 hours  Timing:  Intermittent  Progression:  Waxing and waning  Chronicity:  New  Relieved by:  None tried  Worsened by:  Nothing  Associated symptoms: chest pain and headaches    Associated symptoms: no palpitations, no tinnitus, no vision changes, no vomiting and no weakness        Review of Systems   Constitutional:  Negative for fatigue and fever. HENT:  Negative for congestion, dental problem, tinnitus and voice change. Eyes:  Negative for photophobia and redness. Respiratory:  Negative for choking. Cardiovascular:  Positive for chest pain. Negative for palpitations. Gastrointestinal:  Negative for abdominal pain, anal bleeding and vomiting. Endocrine: Negative for polydipsia, polyphagia and polyuria. Genitourinary:  Negative for decreased urine volume, flank pain, frequency and urgency. Musculoskeletal:  Negative for back pain and gait problem. Skin:  Negative for color change and pallor. Allergic/Immunologic: Negative for food allergies and immunocompromised state. Neurological:  Positive for dizziness and headaches. Negative for tremors and weakness. Hematological:  Negative for adenopathy. Does not bruise/bleed easily. Psychiatric/Behavioral:  Negative for behavioral problems and confusion. All other systems reviewed and are negative.     Past Medical History:   Diagnosis Date    JAZIEL (generalized anxiety disorder)     History of pneumonia     left upper lobe    Hypertension     Kidney stone         No past surgical history on file. Family History   Problem Relation Age of Onset    Hypertension Father     Hypertension Paternal Grandfather     Heart Attack Paternal Grandfather 64    Depression Father     High Cholesterol Father     Alcohol Abuse Mother         Social History     Socioeconomic History    Marital status:    Tobacco Use    Smoking status: Never    Smokeless tobacco: Former   Substance and Sexual Activity    Alcohol use: Yes     Alcohol/week: 1.0 standard drink    Drug use: No         Codeine     Previous Medications    ALBUTEROL SULFATE HFA (VENTOLIN HFA) 108 (90 BASE) MCG/ACT INHALER    Inhale 2 puffs into the lungs 4 times daily as needed for Wheezing    BUPROPION (WELLBUTRIN XL) 300 MG EXTENDED RELEASE TABLET    Take 1 tablet by mouth every morning    CANDESARTAN (ATACAND) 16 MG TABLET    Take 1 tablet by mouth daily    FLUTICASONE (FLONASE) 50 MCG/ACT NASAL SPRAY    2 sprays by Nasal route daily USE 1 SPRAY IN BOTH NOSTRILS ONCE A DAY    TADALAFIL (CIALIS) 5 MG TABLET    Take 1 tablet by mouth as needed for Erectile Dysfunction        Vitals signs and nursing note reviewed. Patient Vitals for the past 4 hrs:   Temp Pulse Resp BP SpO2   12/03/22 1948 98.7 °F (37.1 °C) 94 18 (!) 161/97 99 %          Physical Exam  Vitals and nursing note reviewed. Constitutional:       General: He is not in acute distress. Appearance: Normal appearance. He is not ill-appearing. HENT:      Head: Normocephalic and atraumatic. Right Ear: External ear normal.      Left Ear: External ear normal.      Nose: Nose normal. No congestion or rhinorrhea. Eyes:      General:         Right eye: No discharge. Left eye: No discharge. Pupils: Pupils are equal, round, and reactive to light. Cardiovascular:      Rate and Rhythm: Normal rate and regular rhythm. Pulses: Normal pulses. Heart sounds: Normal heart sounds. Pulmonary:      Effort: Pulmonary effort is normal. No respiratory distress. Breath sounds: Normal breath sounds. No stridor. Abdominal:      General: Abdomen is flat. There is no distension. Palpations: Abdomen is soft. There is no mass. Musculoskeletal:         General: No swelling, tenderness, deformity or signs of injury. Normal range of motion. Cervical back: Normal range of motion and neck supple. Skin:     General: Skin is warm. Capillary Refill: Capillary refill takes less than 2 seconds. Coloration: Skin is not jaundiced or pale. Neurological:      General: No focal deficit present. Mental Status: He is alert and oriented to person, place, and time. Mental status is at baseline. Cranial Nerves: No cranial nerve deficit. Sensory: No sensory deficit. Psychiatric:         Mood and Affect: Mood normal.         Behavior: Behavior normal.        Procedures    No results found for any visits on 12/03/22. XR CHEST PORTABLE    (Results Pending)                       Voice dictation software was used during the making of this note. This software is not perfect and grammatical and other typographical errors may be present. This note has not been completely proofread for errors.      Bunny Bejarano MD  12/03/22 MD Colin  12/03/22 [id number]

## 2022-12-04 NOTE — ED TRIAGE NOTES
Pt to the ED from home with c/o of \"dizziness\" and feeling funny. Pt states that he was at the pet store when this happened around 530 pm tonight. Pt states that he had an episode of chest pain located in his upper neck/collar bone area lasting several minutes. Pt admits to increased stress in life. Pt is tearful during triage. Wife at bedside.

## 2022-12-04 NOTE — ED NOTES
I have reviewed discharge instructions with the patient and spouse. The patient and spouse verbalized understanding. Patient left ED via Discharge Method: ambulatory to Home with wife. Opportunity for questions and clarification provided. Patient given 0 scripts. To continue your aftercare when you leave the hospital, you may receive an automated call from our care team to check in on how you are doing. This is a free service and part of our promise to provide the best care and service to meet your aftercare needs.  If you have questions, or wish to unsubscribe from this service please call [phone]. Thank you for Choosing our [facility] Emergency Department.         Sandip Amado RN  12/03/22 [id number]

## 2022-12-04 NOTE — DISCHARGE INSTRUCTIONS
As we discussed, your testing done today is not indicative of any serious or life-threatening illnesses  Lightheadedness or faintness may be related to a combination of things, however no signs of any cardiac abnormalities  I encourage you to follow-up with your primary care physician  Follow-up with your primary care physician  Try to reduce your stress level  Return to the ER for any new, worsening or life-threatening symptoms

## 2023-02-27 ENCOUNTER — TELEPHONE (OUTPATIENT)
Dept: FAMILY MEDICINE CLINIC | Facility: CLINIC | Age: 43
End: 2023-02-27

## 2023-02-27 DIAGNOSIS — F41.1 GAD (GENERALIZED ANXIETY DISORDER): ICD-10-CM

## 2023-02-27 DIAGNOSIS — I10 ESSENTIAL HYPERTENSION: ICD-10-CM

## 2023-02-27 RX ORDER — BUPROPION HYDROCHLORIDE 300 MG/1
300 TABLET ORAL EVERY MORNING
Qty: 15 TABLET | Refills: 0 | Status: SHIPPED | OUTPATIENT
Start: 2023-02-27

## 2023-02-27 RX ORDER — CANDESARTAN 16 MG/1
16 TABLET ORAL DAILY
Qty: 15 TABLET | Refills: 0 | Status: SHIPPED | OUTPATIENT
Start: 2023-02-27

## 2023-02-27 NOTE — TELEPHONE ENCOUNTER
----- Message from Fernando Nunez sent at 2/27/2023 10:15 AM EST -----  Subject: Refill Request    QUESTIONS  Name of Medication? candesartan (ATACAND) 16 MG tablet  Patient-reported dosage and instructions? 1x daily  How many days do you have left? 5  [id number]  Pharmacy phone number (if available)? [phone]  Additional Information for Provider? Patient has scheduled an appt for the   first available on 3/13/23 for a VV. Patient will be out of meds by   3/4/23. Please call patient to advise if meds can be refilled. ---------------------------------------------------------------------------  --------------,  Name of Medication? buPROPion (WELLBUTRIN XL) 300 MG extended release   tablet  Patient-reported dosage and instructions? 1x daily  How many days do you have left? 5  [id number]  Pharmacy phone number (if available)? [phone]  ---------------------------------------------------------------------------  --------------  Selam LAMA  What is the best way for the office to contact you? OK to leave message on   voicemail  Preferred Call Back Phone Number? [phone]  ---------------------------------------------------------------------------  --------------  SCRIPT ANSWERS  Relationship to Patient?  Self

## 2023-03-13 ENCOUNTER — TELEMEDICINE (OUTPATIENT)
Dept: FAMILY MEDICINE CLINIC | Facility: CLINIC | Age: 43
End: 2023-03-13
Payer: COMMERCIAL

## 2023-03-13 DIAGNOSIS — Z11.59 ENCOUNTER FOR HEPATITIS C SCREENING TEST FOR LOW RISK PATIENT: ICD-10-CM

## 2023-03-13 DIAGNOSIS — N52.01 ERECTILE DYSFUNCTION DUE TO ARTERIAL INSUFFICIENCY: Primary | ICD-10-CM

## 2023-03-13 DIAGNOSIS — I10 ESSENTIAL HYPERTENSION: ICD-10-CM

## 2023-03-13 DIAGNOSIS — Z11.4 ENCOUNTER FOR SCREENING FOR HIV: ICD-10-CM

## 2023-03-13 DIAGNOSIS — F41.1 GAD (GENERALIZED ANXIETY DISORDER): ICD-10-CM

## 2023-03-13 PROCEDURE — 99214 OFFICE O/P EST MOD 30 MIN: CPT | Performed by: FAMILY MEDICINE

## 2023-03-13 RX ORDER — CANDESARTAN 16 MG/1
16 TABLET ORAL DAILY
Qty: 90 TABLET | Refills: 1 | Status: SHIPPED | OUTPATIENT
Start: 2023-03-13

## 2023-03-13 RX ORDER — TADALAFIL 5 MG/1
5 TABLET ORAL PRN
Qty: 30 TABLET | Refills: 1 | Status: SHIPPED | OUTPATIENT
Start: 2023-03-13

## 2023-03-13 RX ORDER — BUPROPION HYDROCHLORIDE 300 MG/1
300 TABLET ORAL EVERY MORNING
Qty: 90 TABLET | Refills: 1 | Status: SHIPPED | OUTPATIENT
Start: 2023-03-13

## 2023-03-13 SDOH — ECONOMIC STABILITY: TRANSPORTATION INSECURITY
IN THE PAST 12 MONTHS, HAS LACK OF TRANSPORTATION KEPT YOU FROM MEETINGS, WORK, OR FROM GETTING THINGS NEEDED FOR DAILY LIVING?: NO

## 2023-03-13 SDOH — ECONOMIC STABILITY: FOOD INSECURITY: WITHIN THE PAST 12 MONTHS, THE FOOD YOU BOUGHT JUST DIDN'T LAST AND YOU DIDN'T HAVE MONEY TO GET MORE.: NEVER TRUE

## 2023-03-13 SDOH — ECONOMIC STABILITY: INCOME INSECURITY: HOW HARD IS IT FOR YOU TO PAY FOR THE VERY BASICS LIKE FOOD, HOUSING, MEDICAL CARE, AND HEATING?: NOT HARD AT ALL

## 2023-03-13 SDOH — ECONOMIC STABILITY: HOUSING INSECURITY
IN THE LAST 12 MONTHS, WAS THERE A TIME WHEN YOU DID NOT HAVE A STEADY PLACE TO SLEEP OR SLEPT IN A SHELTER (INCLUDING NOW)?: NO

## 2023-03-13 SDOH — ECONOMIC STABILITY: FOOD INSECURITY: WITHIN THE PAST 12 MONTHS, YOU WORRIED THAT YOUR FOOD WOULD RUN OUT BEFORE YOU GOT MONEY TO BUY MORE.: NEVER TRUE

## 2023-03-13 ASSESSMENT — ENCOUNTER SYMPTOMS
NAUSEA: 0
ABDOMINAL PAIN: 0
WHEEZING: 0
VOMITING: 0
PHOTOPHOBIA: 0
COUGH: 0
ABDOMINAL DISTENTION: 0
SINUS PAIN: 0
SHORTNESS OF BREATH: 0
DIARRHEA: 0

## 2023-03-13 ASSESSMENT — PATIENT HEALTH QUESTIONNAIRE - PHQ9
SUM OF ALL RESPONSES TO PHQ9 QUESTIONS 1 & 2: 0
2. FEELING DOWN, DEPRESSED OR HOPELESS: 0
SUM OF ALL RESPONSES TO PHQ QUESTIONS 1-9: 0
1. LITTLE INTEREST OR PLEASURE IN DOING THINGS: 0

## 2023-03-13 NOTE — PROGRESS NOTES
Gray Matthews (:  1980) is a 36 y.o. male,Established patient, here for evaluation of the following chief complaint(s):  Medication Refill         ASSESSMENT/PLAN:  1. Encounter for screening for HIV  -     HIV 1/2 Ag/Ab, 4TH Generation,W Rflx Confirm; Future  2. Erectile dysfunction due to arterial insufficiency  -     tadalafil (CIALIS) 5 MG tablet; Take 1 tablet by mouth as needed for Erectile Dysfunction, Disp-30 tablet, R-1Normal  3. Essential hypertension  -     candesartan (ATACAND) 16 MG tablet; Take 1 tablet by mouth daily, Disp-90 tablet, R-1Normal  -     Lipid Panel; Future  4. JAZIEL (generalized anxiety disorder)  -     buPROPion (WELLBUTRIN XL) 300 MG extended release tablet; Take 1 tablet by mouth every morning, Disp-90 tablet, R-1Normal  5. Encounter for hepatitis C screening test for low risk patient  -     Hepatitis C Antibody; Future    Await labs, advised to continue to avoid salt and fatty foods, to check BP and keep a log, to schedule an eye exam.  Continue Cialis 5 mg- works well. Advised patient to lose weight. Also advised to exercise regularly, to eat healthy foods, and to control portion sizes. Return for 1- 2 days- fasting labs; 1 mth- physical; 6 mths- med ref, fasting labs IN OFFICE or prn sooner. Subjective   SUBJECTIVE/OBJECTIVE:  Medication Refill  Pertinent negatives include no abdominal pain, chest pain, chills, congestion, coughing, fever, nausea or vomiting. Virtual visit for 6 month follow up and medication refills, denies any side effects or associated symptoms. We ordered labs after his last OV but he did not do them. He went to the ER on 12/3/22- did CBC, CMP then which were within normal limits. HTN- not as good with cutting back salt, takes Candesartan 16 mg in pm, he has not been checking his BP regularly- last was 120/78 last week, last eye exam was in spring 2022- was wnl- at [facility].      Hyperlipidemia, Overweight- never taken cholesterol medicines before, not doing much fast foods, he is fixing his meals, cut back on fried foods. Says he changed his role at work, not walking as much; unfortunately hyperextended his knee walks a lot at work, typically gets 7k steps a day at work- works in [employer]. As part of a health initiative, they are supposed to run a race with an obstacle course in 1 month. He was doing the treadmill and doing weight machines. He says his weight is 214 lbs, has gained about 6 lbs. (Seasonal allergies- occurs in the spring and fall, uses Flonase prn during these seasons; takes Zyrtec as needed, has a cat and 2 dogs at home, denies any symptoms today.)     JAZIEL- reports doing quite well, except for the 1 trip to the ER when he was anxious. He still snaps with the kids but for good reason, doing better with the career change- not as stressed, denies depression. They are dealing with issues with his MIL- settled down now, ADELINE  in 2019 from a brain bleed, says his Father  in 2020- stressful wrapping up things- overall better. He has had anger issues in the past; takes Wellbutrin in am - works quite well; denies insomnia, SI/ HI. Says he can get counseling through work but did not do it yet- says he does not need it. Erectile dysfunction- ongoing for about 12 months now; has issues with keeping an erection, we started Cialis 5 mg about 6 months ago- also causes his face to flush- resolves in 1 hour. He tried Viagra 25 mg - says his face felt flushed with heart racing. Denies any urinary symptoms. Hepatitis C screening, HIV screening- patient denies any risk factors; except has a tattoo done about 2016. Review of Systems   Constitutional:  Negative for chills and fever. HENT:  Negative for congestion and sinus pain. Eyes:  Negative for photophobia and visual disturbance. Respiratory:  Negative for cough, shortness of breath and wheezing.     Cardiovascular:  Negative for chest pain, palpitations and leg swelling. Gastrointestinal:  Negative for abdominal distention, abdominal pain, diarrhea, nausea and vomiting. Psychiatric/Behavioral:  Negative for agitation, dysphoric mood, self-injury, sleep disturbance and suicidal ideas. The patient is nervous/anxious. The patient is not hyperactive. Objective   Physical Exam     Vital Signs: (As obtained by patient/caregiver at home)  There were no vitals taken for this visit    Constitutional - appears well-developed and well-nourished, no apparent distress  Mental status - alert and awake, able to follow commands  Eyes - sclera normal, no discharge visible bilaterally  ENT - mouth throat-mucous membranes are moist; external ears are normal  Neck - no visualized mass  Pulmonary/chest - respiratory effort is normal, no visualized respiratory distress  Abdomen- denies tenderness on self palpation  Psychiatric - normal mood and affect, cheerful, good eye contact        An electronic signature was used to authenticate this note.     --Magdi Harper MD

## 2023-04-18 ENCOUNTER — TELEPHONE (OUTPATIENT)
Dept: FAMILY MEDICINE CLINIC | Facility: CLINIC | Age: 43
End: 2023-04-18

## 2023-04-18 DIAGNOSIS — F41.1 GAD (GENERALIZED ANXIETY DISORDER): ICD-10-CM

## 2023-04-18 DIAGNOSIS — N52.01 ERECTILE DYSFUNCTION DUE TO ARTERIAL INSUFFICIENCY: ICD-10-CM

## 2023-04-18 DIAGNOSIS — I10 ESSENTIAL HYPERTENSION: ICD-10-CM

## 2023-04-18 NOTE — TELEPHONE ENCOUNTER
Pt. [facility] in stating [facility] to [facility], pt. Wants to know if there is any way you can send his medications directly to [facility].

## 2023-04-18 NOTE — TELEPHONE ENCOUNTER
There are several [employer] and a Home delivery one- please ask patient which one he wants us to use

## 2023-04-20 RX ORDER — TADALAFIL 5 MG/1
5 TABLET ORAL PRN
Qty: 30 TABLET | Refills: 1 | Status: SHIPPED | OUTPATIENT
Start: 2023-04-20

## 2023-04-20 RX ORDER — CANDESARTAN 16 MG/1
16 TABLET ORAL DAILY
Qty: 90 TABLET | Refills: 1 | Status: SHIPPED | OUTPATIENT
Start: 2023-04-20

## 2023-04-20 RX ORDER — BUPROPION HYDROCHLORIDE 300 MG/1
300 TABLET ORAL EVERY MORNING
Qty: 90 TABLET | Refills: 1 | Status: SHIPPED | OUTPATIENT
Start: 2023-04-20

## 2023-05-31 DIAGNOSIS — N52.01 ERECTILE DYSFUNCTION DUE TO ARTERIAL INSUFFICIENCY: ICD-10-CM

## 2023-05-31 RX ORDER — TADALAFIL 5 MG/1
TABLET ORAL
Qty: 30 TABLET | Refills: 0 | OUTPATIENT
Start: 2023-05-31

## 2023-09-16 DIAGNOSIS — I10 ESSENTIAL HYPERTENSION: ICD-10-CM

## 2023-09-16 DIAGNOSIS — F41.1 GAD (GENERALIZED ANXIETY DISORDER): ICD-10-CM

## 2023-09-18 RX ORDER — CANDESARTAN 16 MG/1
16 TABLET ORAL DAILY
Qty: 90 TABLET | Refills: 0 | OUTPATIENT
Start: 2023-09-18

## 2023-09-18 RX ORDER — BUPROPION HYDROCHLORIDE 300 MG/1
300 TABLET ORAL EVERY MORNING
Qty: 90 TABLET | Refills: 0 | OUTPATIENT
Start: 2023-09-18

## 2024-06-14 ENCOUNTER — TELEPHONE (OUTPATIENT)
Dept: FAMILY MEDICINE CLINIC | Facility: CLINIC | Age: 44
End: 2024-06-14

## 2024-06-14 ASSESSMENT — PATIENT HEALTH QUESTIONNAIRE - PHQ9
SUM OF ALL RESPONSES TO PHQ QUESTIONS 1-9: 0
2. FEELING DOWN, DEPRESSED OR HOPELESS: NOT AT ALL
2. FEELING DOWN, DEPRESSED OR HOPELESS: NOT AT ALL
SUM OF ALL RESPONSES TO PHQ QUESTIONS 1-9: 0
SUM OF ALL RESPONSES TO PHQ QUESTIONS 1-9: 0
SUM OF ALL RESPONSES TO PHQ9 QUESTIONS 1 & 2: 0
1. LITTLE INTEREST OR PLEASURE IN DOING THINGS: NOT AT ALL
SUM OF ALL RESPONSES TO PHQ9 QUESTIONS 1 & 2: 0
1. LITTLE INTEREST OR PLEASURE IN DOING THINGS: NOT AT ALL
SUM OF ALL RESPONSES TO PHQ QUESTIONS 1-9: 0

## 2024-06-14 NOTE — TELEPHONE ENCOUNTER
----- Message from Jun Martinez sent at 6/14/2024  9:48 AM EDT -----  Regarding: [facility] Appointment Request  [facility] Appointment Request    Patient needs appointment for [facility] Appointment Type: Existing Condition Follow Up.    Reason for Appointment Request: No appointments available during search  --------------------------------------------------------------------------------------------------------------------------    Relationship to Patient: Self     Call Back Information: OK to leave message on voicemail  Preferred Call Back Number: Phone [phone]    Patient wanted a temporary provider for his appointment  patient preferred date is any day for next but not the 20th.

## 2024-06-18 ENCOUNTER — OFFICE VISIT (OUTPATIENT)
Dept: FAMILY MEDICINE CLINIC | Facility: CLINIC | Age: 44
End: 2024-06-18
Payer: COMMERCIAL

## 2024-06-18 VITALS
HEART RATE: 90 BPM | WEIGHT: 208.7 LBS | DIASTOLIC BLOOD PRESSURE: 80 MMHG | RESPIRATION RATE: 16 BRPM | BODY MASS INDEX: 29.22 KG/M2 | HEIGHT: 71 IN | SYSTOLIC BLOOD PRESSURE: 132 MMHG | OXYGEN SATURATION: 99 % | TEMPERATURE: 97.3 F

## 2024-06-18 DIAGNOSIS — E78.2 MIXED HYPERLIPIDEMIA: Chronic | ICD-10-CM

## 2024-06-18 DIAGNOSIS — Z00.00 PHYSICAL EXAM, ANNUAL: Primary | Chronic | ICD-10-CM

## 2024-06-18 DIAGNOSIS — N52.01 ERECTILE DYSFUNCTION DUE TO ARTERIAL INSUFFICIENCY: Chronic | ICD-10-CM

## 2024-06-18 DIAGNOSIS — J30.1 SEASONAL ALLERGIC RHINITIS DUE TO POLLEN: Chronic | ICD-10-CM

## 2024-06-18 DIAGNOSIS — Z00.00 PHYSICAL EXAM, ANNUAL: Chronic | ICD-10-CM

## 2024-06-18 DIAGNOSIS — I10 ESSENTIAL HYPERTENSION: Chronic | ICD-10-CM

## 2024-06-18 DIAGNOSIS — Z11.59 ENCOUNTER FOR HEPATITIS C SCREENING TEST FOR LOW RISK PATIENT: ICD-10-CM

## 2024-06-18 DIAGNOSIS — F41.1 GAD (GENERALIZED ANXIETY DISORDER): Chronic | ICD-10-CM

## 2024-06-18 DIAGNOSIS — Z11.4 ENCOUNTER FOR SCREENING FOR HIV: ICD-10-CM

## 2024-06-18 DIAGNOSIS — E66.3 OVERWEIGHT: Chronic | ICD-10-CM

## 2024-06-18 LAB
ALBUMIN SERPL-MCNC: 4.5 G/DL (ref 3.5–5)
ALBUMIN/GLOB SERPL: 1.1 (ref 1–1.9)
ALP SERPL-CCNC: 101 U/L (ref 40–129)
ALT SERPL-CCNC: 29 U/L (ref 12–65)
ANION GAP SERPL CALC-SCNC: 10 MMOL/L (ref 9–18)
AST SERPL-CCNC: 27 U/L (ref 15–37)
BASOPHILS # BLD: 0 K/UL (ref 0–0.2)
BASOPHILS NFR BLD: 1 % (ref 0–2)
BILIRUB SERPL-MCNC: 0.2 MG/DL (ref 0–1.2)
BUN SERPL-MCNC: 13 MG/DL (ref 6–23)
CALCIUM SERPL-MCNC: 10.1 MG/DL (ref 8.8–10.2)
CHLORIDE SERPL-SCNC: 103 MMOL/L (ref 98–107)
CHOLEST SERPL-MCNC: 227 MG/DL (ref 0–200)
CO2 SERPL-SCNC: 25 MMOL/L (ref 20–28)
CREAT SERPL-MCNC: 1.21 MG/DL (ref 0.8–1.3)
DIFFERENTIAL METHOD BLD: NORMAL
EOSINOPHIL # BLD: 0.1 K/UL (ref 0–0.8)
EOSINOPHIL NFR BLD: 2 % (ref 0.5–7.8)
ERYTHROCYTE [DISTWIDTH] IN BLOOD BY AUTOMATED COUNT: 13 % (ref 11.9–14.6)
GLOBULIN SER CALC-MCNC: 4.1 G/DL (ref 2.3–3.5)
GLUCOSE SERPL-MCNC: 88 MG/DL (ref 70–99)
HCT VFR BLD AUTO: 45.3 % (ref 41.1–50.3)
HCV AB SER QL: NONREACTIVE
HDLC SERPL-MCNC: 35 MG/DL (ref 40–60)
HDLC SERPL: 6.4 (ref 0–5)
HGB BLD-MCNC: 14.3 G/DL (ref 13.6–17.2)
HIV 1+2 AB+HIV1 P24 AG SERPL QL IA: NONREACTIVE
HIV 1/2 RESULT COMMENT: NORMAL
IMM GRANULOCYTES # BLD AUTO: 0 K/UL (ref 0–0.5)
IMM GRANULOCYTES NFR BLD AUTO: 0 % (ref 0–5)
LDLC SERPL CALC-MCNC: 160 MG/DL (ref 0–100)
LYMPHOCYTES # BLD: 1.7 K/UL (ref 0.5–4.6)
LYMPHOCYTES NFR BLD: 27 % (ref 13–44)
MCH RBC QN AUTO: 28.4 PG (ref 26.1–32.9)
MCHC RBC AUTO-ENTMCNC: 31.6 G/DL (ref 31.4–35)
MCV RBC AUTO: 89.9 FL (ref 82–102)
MONOCYTES # BLD: 0.4 K/UL (ref 0.1–1.3)
MONOCYTES NFR BLD: 6 % (ref 4–12)
NEUTS SEG # BLD: 4.1 K/UL (ref 1.7–8.2)
NEUTS SEG NFR BLD: 64 % (ref 43–78)
NRBC # BLD: 0 K/UL (ref 0–0.2)
PLATELET # BLD AUTO: 368 K/UL (ref 150–450)
PMV BLD AUTO: 9.4 FL (ref 9.4–12.3)
POTASSIUM SERPL-SCNC: 4.5 MMOL/L (ref 3.5–5.1)
PROT SERPL-MCNC: 8.5 G/DL (ref 6.3–8.2)
RBC # BLD AUTO: 5.04 M/UL (ref 4.23–5.6)
SODIUM SERPL-SCNC: 139 MMOL/L (ref 136–145)
TRIGL SERPL-MCNC: 157 MG/DL (ref 0–150)
VLDLC SERPL CALC-MCNC: 31 MG/DL (ref 6–23)
WBC # BLD AUTO: 6.4 K/UL (ref 4.3–11.1)

## 2024-06-18 PROCEDURE — 3079F DIAST BP 80-89 MM HG: CPT | Performed by: FAMILY MEDICINE

## 2024-06-18 PROCEDURE — 99214 OFFICE O/P EST MOD 30 MIN: CPT | Performed by: FAMILY MEDICINE

## 2024-06-18 PROCEDURE — 3075F SYST BP GE 130 - 139MM HG: CPT | Performed by: FAMILY MEDICINE

## 2024-06-18 PROCEDURE — 99396 PREV VISIT EST AGE 40-64: CPT | Performed by: FAMILY MEDICINE

## 2024-06-18 RX ORDER — CANDESARTAN 16 MG/1
16 TABLET ORAL DAILY
Qty: 90 TABLET | Refills: 1 | Status: SHIPPED | OUTPATIENT
Start: 2024-06-18

## 2024-06-18 RX ORDER — TADALAFIL 5 MG/1
5 TABLET ORAL PRN
Qty: 30 TABLET | Refills: 1 | Status: SHIPPED | OUTPATIENT
Start: 2024-06-18

## 2024-06-18 RX ORDER — BUPROPION HYDROCHLORIDE 300 MG/1
300 TABLET ORAL EVERY MORNING
Qty: 90 TABLET | Refills: 1 | Status: SHIPPED | OUTPATIENT
Start: 2024-06-18

## 2024-06-18 RX ORDER — FLUTICASONE PROPIONATE 50 MCG
2 SPRAY, SUSPENSION (ML) NASAL DAILY
Qty: 16 G | Refills: 5 | Status: SHIPPED | OUTPATIENT
Start: 2024-06-18

## 2024-06-18 SDOH — ECONOMIC STABILITY: FOOD INSECURITY: WITHIN THE PAST 12 MONTHS, YOU WORRIED THAT YOUR FOOD WOULD RUN OUT BEFORE YOU GOT MONEY TO BUY MORE.: NEVER TRUE

## 2024-06-18 SDOH — ECONOMIC STABILITY: FOOD INSECURITY: WITHIN THE PAST 12 MONTHS, THE FOOD YOU BOUGHT JUST DIDN'T LAST AND YOU DIDN'T HAVE MONEY TO GET MORE.: NEVER TRUE

## 2024-06-18 SDOH — ECONOMIC STABILITY: INCOME INSECURITY: HOW HARD IS IT FOR YOU TO PAY FOR THE VERY BASICS LIKE FOOD, HOUSING, MEDICAL CARE, AND HEATING?: NOT HARD AT ALL

## 2024-06-18 ASSESSMENT — ENCOUNTER SYMPTOMS
VOMITING: 0
EYE PAIN: 0
SORE THROAT: 0
DIARRHEA: 0
PHOTOPHOBIA: 0
NAUSEA: 0
ABDOMINAL PAIN: 0
BACK PAIN: 0
SINUS PAIN: 0
SHORTNESS OF BREATH: 0
BLOOD IN STOOL: 0
WHEEZING: 0
COUGH: 0

## 2024-06-18 NOTE — PROGRESS NOTES
2024    Randy Yost Jr. (:  1980) is a 43 y.o. male, here for a preventive medicine evaluation.    Subjective   Patient comes today for a physical and is fasting for labs.  He had a colonoscopy x 2 because of heartburn the 1st time and Anemia- unexplained blood loss. He has never had his PSA checked. He denies any GI or urinary symptoms, denies any family history of colon or prostate cancer.       He took his last Td on 3/6/2019; has not been taking the Flu vaccine lately, Covid vaccine- 2021, booster on 21- does not want to take any more.             His last eye exam was 2 months ago at [facility]; his last dental cleaning was about 3- 4 years ago.        Hepatitis C screening, HIV screening- patient denies any risk factors; except has a tattoo done about  not at a licensed place- used sterile needles.    HTN- better with cutting back salt, takes Candesartan 16 mg in pm, says his BP are 125- 129 systolic and 79- 84, last eye exam was 2 months ago at [facility].     Hyperlipidemia, Overweight- never taken cholesterol medicines before, eats fast foods only once a month, eats a lot of Italian food, he is fixing his meals, cut back on fried foods, eats a good bit of weinberg. He changed his job, walks less than a mile at work, does things on the weekends, may play pickle ball and golf. He stopped doing the treadmill and doing weight machines. He has lost 6 lbs in the last 2 years, cut back on soft drinks, drinking more water, cut back on sweets.     Lab Results   Component Value Date    CHOL 227 (H) 2024    CHOL 172 2022    CHOL 165 2021     Lab Results   Component Value Date    TRIG 157 (H) 2024    TRIG 92 2022    TRIG 88 2021     Lab Results   Component Value Date    HDL 35 (L) 2024    HDL 35 (L) 2022    HDL 37 (L) 2021     Lab Results   Component Value Date     (H) 2024     (H) 2022     (H) 
Yes

## 2024-06-19 RX ORDER — ROSUVASTATIN CALCIUM 10 MG/1
10 TABLET, COATED ORAL NIGHTLY
Qty: 30 TABLET | Refills: 1 | Status: SHIPPED | OUTPATIENT
Start: 2024-06-19

## 2024-08-18 DIAGNOSIS — E78.2 MIXED HYPERLIPIDEMIA: Chronic | ICD-10-CM

## 2024-08-19 RX ORDER — ROSUVASTATIN CALCIUM 10 MG/1
10 TABLET, COATED ORAL NIGHTLY
Qty: 30 TABLET | Refills: 1 | OUTPATIENT
Start: 2024-08-19

## 2024-12-18 ENCOUNTER — OFFICE VISIT (OUTPATIENT)
Dept: FAMILY MEDICINE CLINIC | Facility: CLINIC | Age: 44
End: 2024-12-18
Payer: COMMERCIAL

## 2024-12-18 VITALS
DIASTOLIC BLOOD PRESSURE: 80 MMHG | BODY MASS INDEX: 29.41 KG/M2 | HEART RATE: 88 BPM | HEIGHT: 71 IN | SYSTOLIC BLOOD PRESSURE: 126 MMHG | OXYGEN SATURATION: 97 % | WEIGHT: 210.1 LBS | RESPIRATION RATE: 16 BRPM | TEMPERATURE: 97.4 F

## 2024-12-18 DIAGNOSIS — E78.2 MIXED HYPERLIPIDEMIA: Primary | Chronic | ICD-10-CM

## 2024-12-18 DIAGNOSIS — E78.2 MIXED HYPERLIPIDEMIA: Chronic | ICD-10-CM

## 2024-12-18 DIAGNOSIS — J30.1 SEASONAL ALLERGIC RHINITIS DUE TO POLLEN: Chronic | ICD-10-CM

## 2024-12-18 DIAGNOSIS — I10 ESSENTIAL HYPERTENSION: Chronic | ICD-10-CM

## 2024-12-18 DIAGNOSIS — E66.3 OVERWEIGHT: Chronic | ICD-10-CM

## 2024-12-18 DIAGNOSIS — F41.1 GAD (GENERALIZED ANXIETY DISORDER): Chronic | ICD-10-CM

## 2024-12-18 DIAGNOSIS — N52.01 ERECTILE DYSFUNCTION DUE TO ARTERIAL INSUFFICIENCY: Chronic | ICD-10-CM

## 2024-12-18 LAB
ALBUMIN SERPL-MCNC: 4.1 G/DL (ref 3.5–5)
ALBUMIN/GLOB SERPL: 1 (ref 1–1.9)
ALP SERPL-CCNC: 107 U/L (ref 40–129)
ALT SERPL-CCNC: 34 U/L (ref 8–55)
ANION GAP SERPL CALC-SCNC: 11 MMOL/L (ref 7–16)
AST SERPL-CCNC: 27 U/L (ref 15–37)
BASOPHILS # BLD: 0 K/UL (ref 0–0.2)
BASOPHILS NFR BLD: 1 % (ref 0–2)
BILIRUB SERPL-MCNC: 0.3 MG/DL (ref 0–1.2)
BUN SERPL-MCNC: 18 MG/DL (ref 6–23)
CALCIUM SERPL-MCNC: 10 MG/DL (ref 8.8–10.2)
CHLORIDE SERPL-SCNC: 101 MMOL/L (ref 98–107)
CHOLEST SERPL-MCNC: 198 MG/DL (ref 0–200)
CO2 SERPL-SCNC: 26 MMOL/L (ref 20–29)
CREAT SERPL-MCNC: 1.27 MG/DL (ref 0.8–1.3)
DIFFERENTIAL METHOD BLD: ABNORMAL
EOSINOPHIL # BLD: 0.1 K/UL (ref 0–0.8)
EOSINOPHIL NFR BLD: 2 % (ref 0.5–7.8)
ERYTHROCYTE [DISTWIDTH] IN BLOOD BY AUTOMATED COUNT: 12.4 % (ref 11.9–14.6)
GLOBULIN SER CALC-MCNC: 4.2 G/DL (ref 2.3–3.5)
GLUCOSE SERPL-MCNC: 79 MG/DL (ref 70–99)
HCT VFR BLD AUTO: 42.9 % (ref 41.1–50.3)
HDLC SERPL-MCNC: 32 MG/DL (ref 40–60)
HDLC SERPL: 6.1 (ref 0–5)
HGB BLD-MCNC: 13.5 G/DL (ref 13.6–17.2)
IMM GRANULOCYTES # BLD AUTO: 0 K/UL (ref 0–0.5)
IMM GRANULOCYTES NFR BLD AUTO: 0 % (ref 0–5)
LDLC SERPL CALC-MCNC: 147 MG/DL (ref 0–100)
LYMPHOCYTES # BLD: 1.8 K/UL (ref 0.5–4.6)
LYMPHOCYTES NFR BLD: 27 % (ref 13–44)
MCH RBC QN AUTO: 28.3 PG (ref 26.1–32.9)
MCHC RBC AUTO-ENTMCNC: 31.5 G/DL (ref 31.4–35)
MCV RBC AUTO: 89.9 FL (ref 82–102)
MONOCYTES # BLD: 0.5 K/UL (ref 0.1–1.3)
MONOCYTES NFR BLD: 7 % (ref 4–12)
NEUTS SEG # BLD: 4.3 K/UL (ref 1.7–8.2)
NEUTS SEG NFR BLD: 63 % (ref 43–78)
NRBC # BLD: 0 K/UL (ref 0–0.2)
PLATELET # BLD AUTO: 383 K/UL (ref 150–450)
PMV BLD AUTO: 9.7 FL (ref 9.4–12.3)
POTASSIUM SERPL-SCNC: 4.7 MMOL/L (ref 3.5–5.1)
PROT SERPL-MCNC: 8.2 G/DL (ref 6.3–8.2)
RBC # BLD AUTO: 4.77 M/UL (ref 4.23–5.6)
SODIUM SERPL-SCNC: 138 MMOL/L (ref 136–145)
TRIGL SERPL-MCNC: 95 MG/DL (ref 0–150)
VLDLC SERPL CALC-MCNC: 19 MG/DL (ref 6–23)
WBC # BLD AUTO: 6.7 K/UL (ref 4.3–11.1)

## 2024-12-18 PROCEDURE — 99214 OFFICE O/P EST MOD 30 MIN: CPT | Performed by: FAMILY MEDICINE

## 2024-12-18 PROCEDURE — 3074F SYST BP LT 130 MM HG: CPT | Performed by: FAMILY MEDICINE

## 2024-12-18 PROCEDURE — 3079F DIAST BP 80-89 MM HG: CPT | Performed by: FAMILY MEDICINE

## 2024-12-18 RX ORDER — CANDESARTAN 16 MG/1
16 TABLET ORAL DAILY
Qty: 90 TABLET | Refills: 1 | Status: SHIPPED | OUTPATIENT
Start: 2024-12-18

## 2024-12-18 RX ORDER — BUPROPION HYDROCHLORIDE 300 MG/1
300 TABLET ORAL EVERY MORNING
Qty: 90 TABLET | Refills: 1 | Status: SHIPPED | OUTPATIENT
Start: 2024-12-18

## 2024-12-18 RX ORDER — TADALAFIL 5 MG/1
5 TABLET ORAL PRN
Qty: 10 TABLET | Refills: 5 | Status: SHIPPED | OUTPATIENT
Start: 2024-12-18

## 2024-12-18 RX ORDER — FLUTICASONE PROPIONATE 50 MCG
2 SPRAY, SUSPENSION (ML) NASAL DAILY
Qty: 16 G | Refills: 5 | Status: CANCELLED | OUTPATIENT
Start: 2024-12-18

## 2024-12-18 ASSESSMENT — ENCOUNTER SYMPTOMS
DIARRHEA: 0
SORE THROAT: 0
EYE DISCHARGE: 0
VOMITING: 0
VOICE CHANGE: 0
WHEEZING: 0
EYE ITCHING: 0
SINUS PAIN: 0
ABDOMINAL PAIN: 0
PHOTOPHOBIA: 0
NAUSEA: 0
EYE PAIN: 0
COUGH: 0
CHOKING: 0
SHORTNESS OF BREATH: 0
SINUS PRESSURE: 0
EYE REDNESS: 0

## 2024-12-18 ASSESSMENT — PATIENT HEALTH QUESTIONNAIRE - PHQ9
2. FEELING DOWN, DEPRESSED OR HOPELESS: NOT AT ALL
SUM OF ALL RESPONSES TO PHQ QUESTIONS 1-9: 0
SUM OF ALL RESPONSES TO PHQ9 QUESTIONS 1 & 2: 0
SUM OF ALL RESPONSES TO PHQ QUESTIONS 1-9: 0
1. LITTLE INTEREST OR PLEASURE IN DOING THINGS: NOT AT ALL
SUM OF ALL RESPONSES TO PHQ QUESTIONS 1-9: 0
SUM OF ALL RESPONSES TO PHQ QUESTIONS 1-9: 0

## 2024-12-18 NOTE — PROGRESS NOTES
Randy Yost Jr. (:  1980) is a 44 y.o. male,Established patient, here for evaluation of the following chief complaint(s):  Medication Refill and Labs Only         Assessment & Plan  Mixed hyperlipidemia   Chronic, not at goal (unstable), await labs, advised to follow a low fat diet, consider starting a different statin than Crestor if needed.  Discussed the possible risks and consequences of elevated LDL and strategies to lower it.     Orders:    Comprehensive Metabolic Panel; Future    Lipid Panel; Future    Essential hypertension   Chronic, at goal (stable), continue current treatment plan    Orders:    candesartan (ATACAND) 16 MG tablet; Take 1 tablet by mouth daily    CBC with Auto Differential; Future    Comprehensive Metabolic Panel; Future    Erectile dysfunction due to arterial insufficiency   Chronic, at goal (stable), continue current treatment plan    Orders:    tadalafil (CIALIS) 5 MG tablet; Take 1 tablet by mouth as needed for Erectile Dysfunction    Seasonal allergic rhinitis due to pollen   Chronic, at goal (stable), continue current treatment plan         JAZIEL (generalized anxiety disorder)   Chronic, not at goal (unstable),  continue Wellbutrin  mg , start Trintellix 5 mg  Discussed possible side effects including possible SI/ HI- to call 911 or go to the ER if this happens- patient understands and agrees.    Orders:    buPROPion (WELLBUTRIN XL) 300 MG extended release tablet; Take 1 tablet by mouth every morning    Overweight   Chronic, not at goal (unstable), advised to lose weight         BMI 29.0-29.9,adult   Chronic, not at goal (unstable), advised to lose weight           Return for 3-4 wks- f/u JAZIEL; 6 mths- , medication refills, fasting labs or prn sooner.       Subjective   Medication Refill  Associated symptoms include congestion. Pertinent negatives include no abdominal pain, chest pain, chills, coughing, fever, headaches, nausea, sore throat or vomiting.

## 2024-12-18 NOTE — ASSESSMENT & PLAN NOTE
Chronic, at goal (stable), continue current treatment plan    Orders:    candesartan (ATACAND) 16 MG tablet; Take 1 tablet by mouth daily    CBC with Auto Differential; Future    Comprehensive Metabolic Panel; Future

## 2024-12-18 NOTE — ASSESSMENT & PLAN NOTE
Chronic, at goal (stable), continue current treatment plan    Orders:    tadalafil (CIALIS) 5 MG tablet; Take 1 tablet by mouth as needed for Erectile Dysfunction

## 2024-12-18 NOTE — ASSESSMENT & PLAN NOTE
Chronic, not at goal (unstable), await labs, advised to follow a low fat diet, consider starting a different statin than Crestor if needed.  Discussed the possible risks and consequences of elevated LDL and strategies to lower it.     Orders:    Comprehensive Metabolic Panel; Future    Lipid Panel; Future

## 2024-12-18 NOTE — ASSESSMENT & PLAN NOTE
Chronic, not at goal (unstable), continue Wellbutrin  mg , start Trintellix 5 mg  Discussed possible side effects including possible SI/ HI- to call 911 or go to the ER if this happens- patient understands and agrees.    Orders:    buPROPion (WELLBUTRIN XL) 300 MG extended release tablet; Take 1 tablet by mouth every morning

## 2024-12-23 RX ORDER — PRAVASTATIN SODIUM 10 MG
10 TABLET ORAL DAILY
Qty: 30 TABLET | Refills: 1 | Status: SHIPPED | OUTPATIENT
Start: 2024-12-23

## 2024-12-31 RX ORDER — PROPRANOLOL HCL 20 MG
20 TABLET ORAL 3 TIMES DAILY
Qty: 90 TABLET | Refills: 0 | Status: SHIPPED | OUTPATIENT
Start: 2024-12-31 | End: 2025-01-30

## 2025-01-24 RX ORDER — PROPRANOLOL HCL 20 MG
20 TABLET ORAL 3 TIMES DAILY
Qty: 270 TABLET | Refills: 1 | OUTPATIENT
Start: 2025-01-24

## 2025-01-27 SDOH — ECONOMIC STABILITY: FOOD INSECURITY: WITHIN THE PAST 12 MONTHS, THE FOOD YOU BOUGHT JUST DIDN'T LAST AND YOU DIDN'T HAVE MONEY TO GET MORE.: NEVER TRUE

## 2025-01-27 SDOH — ECONOMIC STABILITY: INCOME INSECURITY: IN THE LAST 12 MONTHS, WAS THERE A TIME WHEN YOU WERE NOT ABLE TO PAY THE MORTGAGE OR RENT ON TIME?: YES

## 2025-01-27 SDOH — ECONOMIC STABILITY: FOOD INSECURITY: WITHIN THE PAST 12 MONTHS, YOU WORRIED THAT YOUR FOOD WOULD RUN OUT BEFORE YOU GOT MONEY TO BUY MORE.: NEVER TRUE

## 2025-01-27 SDOH — ECONOMIC STABILITY: TRANSPORTATION INSECURITY
IN THE PAST 12 MONTHS, HAS THE LACK OF TRANSPORTATION KEPT YOU FROM MEDICAL APPOINTMENTS OR FROM GETTING MEDICATIONS?: NO

## 2025-01-27 ASSESSMENT — PATIENT HEALTH QUESTIONNAIRE - PHQ9
2. FEELING DOWN, DEPRESSED OR HOPELESS: NOT AT ALL
1. LITTLE INTEREST OR PLEASURE IN DOING THINGS: NOT AT ALL
SUM OF ALL RESPONSES TO PHQ QUESTIONS 1-9: 0
1. LITTLE INTEREST OR PLEASURE IN DOING THINGS: NOT AT ALL
SUM OF ALL RESPONSES TO PHQ QUESTIONS 1-9: 0
SUM OF ALL RESPONSES TO PHQ QUESTIONS 1-9: 0
2. FEELING DOWN, DEPRESSED OR HOPELESS: NOT AT ALL
SUM OF ALL RESPONSES TO PHQ9 QUESTIONS 1 & 2: 0
SUM OF ALL RESPONSES TO PHQ QUESTIONS 1-9: 0
SUM OF ALL RESPONSES TO PHQ9 QUESTIONS 1 & 2: 0

## 2025-01-28 ENCOUNTER — OFFICE VISIT (OUTPATIENT)
Dept: FAMILY MEDICINE CLINIC | Facility: CLINIC | Age: 45
End: 2025-01-28
Payer: COMMERCIAL

## 2025-01-28 VITALS
BODY MASS INDEX: 29.72 KG/M2 | OXYGEN SATURATION: 97 % | RESPIRATION RATE: 16 BRPM | TEMPERATURE: 97.3 F | HEIGHT: 71 IN | SYSTOLIC BLOOD PRESSURE: 114 MMHG | WEIGHT: 212.3 LBS | HEART RATE: 93 BPM | DIASTOLIC BLOOD PRESSURE: 80 MMHG

## 2025-01-28 DIAGNOSIS — F41.1 GAD (GENERALIZED ANXIETY DISORDER): Primary | ICD-10-CM

## 2025-01-28 DIAGNOSIS — E78.2 MIXED HYPERLIPIDEMIA: Chronic | ICD-10-CM

## 2025-01-28 PROCEDURE — 3074F SYST BP LT 130 MM HG: CPT | Performed by: FAMILY MEDICINE

## 2025-01-28 PROCEDURE — 3079F DIAST BP 80-89 MM HG: CPT | Performed by: FAMILY MEDICINE

## 2025-01-28 PROCEDURE — 99214 OFFICE O/P EST MOD 30 MIN: CPT | Performed by: FAMILY MEDICINE

## 2025-01-28 RX ORDER — ESCITALOPRAM OXALATE 5 MG/1
5 TABLET ORAL DAILY
Qty: 30 TABLET | Refills: 0 | Status: SHIPPED | OUTPATIENT
Start: 2025-01-28

## 2025-01-28 RX ORDER — PRAVASTATIN SODIUM 10 MG
10 TABLET ORAL DAILY
Qty: 30 TABLET | Refills: 3 | Status: SHIPPED | OUTPATIENT
Start: 2025-01-28

## 2025-01-28 ASSESSMENT — ENCOUNTER SYMPTOMS
CHOKING: 0
SHORTNESS OF BREATH: 0
WHEEZING: 0
ABDOMINAL PAIN: 0
COUGH: 0
NAUSEA: 0
PHOTOPHOBIA: 0
VOMITING: 0
SORE THROAT: 0
DIARRHEA: 0

## 2025-01-28 NOTE — ASSESSMENT & PLAN NOTE
Chronic, not at goal (unstable), continue Wellbutrin in am, to use Propranolol prn for increased anxiety.  Start Lexapro 5 mg in pm- discussed possible side effects including possible SI/ HI- to call 911 or go to the ER if this happens- patient understands and agrees.    Orders:    escitalopram (LEXAPRO) 5 MG tablet; Take 1 tablet by mouth daily

## 2025-01-28 NOTE — PROGRESS NOTES
Randy Yost Jr. (:  1980) is a 44 y.o. male,Established patient, here for evaluation of the following chief complaint(s):  Anxiety and Cholesterol Problem         Assessment & Plan  JAZIEL (generalized anxiety disorder)   Chronic, not at goal (unstable), continue Wellbutrin in am, to use Propranolol prn for increased anxiety.  Start Lexapro 5 mg in pm- discussed possible side effects including possible SI/ HI- to call 911 or go to the ER if this happens- patient understands and agrees.    Orders:    escitalopram (LEXAPRO) 5 MG tablet; Take 1 tablet by mouth daily    Mixed hyperlipidemia   Chronic, not at goal (unstable), continue current treatment plan, await labs- scheduled for 2/3/25.    Orders:    pravastatin (PRAVACHOL) 10 MG tablet; Take 1 tablet by mouth daily        Return in about 2 weeks (around 2025) for f/u JAZIEL- virtual.       Subjective   Medication Refill  Pertinent negatives include no abdominal pain, chest pain, chills, coughing, fever, nausea, sore throat or vomiting.   Anxiety  Symptoms include nervous/anxious behavior. Patient reports no chest pain, nausea, palpitations, shortness of breath or suicidal ideas.         Patient comes today for a 1.5 month follow up of-  Hyperlipidemia, (Overweight)- he was prescribed Crestor 10 mg 6 months ago- took for 2 months and stopped- c/o dry cough and a cold feeling in his chest- did not come back to repeat labs. He is taking fish oil capsules 1400 mg daily, we started Pravastatin 10 mg - takes after dinner, no side effects. He has cut back on fast foods to once a week, eats a lot of Italian food, he is fixing his meals at times, cut back on fried foods, eats a good bit of weinberg. He is not fasting today for labs. (He was walking 6-7 miles on the weekends of [employer] games during the season, may play pickle ball and golf. He stopped doing the treadmill and doing weight machines. He has gained 2 lbs in the last 6 months but had lost 6 lbs in

## 2025-01-28 NOTE — ASSESSMENT & PLAN NOTE
Chronic, not at goal (unstable), continue current treatment plan, await labs- scheduled for 2/3/25.    Orders:    pravastatin (PRAVACHOL) 10 MG tablet; Take 1 tablet by mouth daily

## 2025-02-11 ENCOUNTER — TELEMEDICINE (OUTPATIENT)
Dept: FAMILY MEDICINE CLINIC | Facility: CLINIC | Age: 45
End: 2025-02-11
Payer: COMMERCIAL

## 2025-02-11 DIAGNOSIS — R45.4 EXCESSIVE ANGER: ICD-10-CM

## 2025-02-11 DIAGNOSIS — F41.1 GAD (GENERALIZED ANXIETY DISORDER): Primary | ICD-10-CM

## 2025-02-11 PROCEDURE — 99214 OFFICE O/P EST MOD 30 MIN: CPT | Performed by: FAMILY MEDICINE

## 2025-02-11 RX ORDER — ESCITALOPRAM OXALATE 5 MG/1
5 TABLET ORAL DAILY
Qty: 90 TABLET | Refills: 1 | Status: CANCELLED | OUTPATIENT
Start: 2025-02-11

## 2025-02-11 RX ORDER — ESCITALOPRAM OXALATE 10 MG/1
10 TABLET ORAL DAILY
Qty: 30 TABLET | Refills: 0 | Status: SHIPPED | OUTPATIENT
Start: 2025-02-11

## 2025-02-11 ASSESSMENT — ENCOUNTER SYMPTOMS
DIARRHEA: 0
SHORTNESS OF BREATH: 0
SORE THROAT: 0
NAUSEA: 0
ABDOMINAL PAIN: 0
WHEEZING: 0
COUGH: 0
PHOTOPHOBIA: 0
VOMITING: 0

## 2025-02-11 ASSESSMENT — PATIENT HEALTH QUESTIONNAIRE - PHQ9
SUM OF ALL RESPONSES TO PHQ QUESTIONS 1-9: 0
SUM OF ALL RESPONSES TO PHQ QUESTIONS 1-9: 0
SUM OF ALL RESPONSES TO PHQ9 QUESTIONS 1 & 2: 0
1. LITTLE INTEREST OR PLEASURE IN DOING THINGS: NOT AT ALL
SUM OF ALL RESPONSES TO PHQ QUESTIONS 1-9: 0
2. FEELING DOWN, DEPRESSED OR HOPELESS: NOT AT ALL
SUM OF ALL RESPONSES TO PHQ QUESTIONS 1-9: 0

## 2025-02-11 NOTE — PROGRESS NOTES
Randy Yost Jr., was evaluated through a synchronous (real-time) audio-video encounter. The patient (or guardian if applicable) is aware that this is a billable service, which includes applicable co-pays. This Virtual Visit was conducted with patient's (and/or legal guardian's) consent. Patient identification was verified, and a caregiver was present when appropriate.   The patient was located at Other: car  Provider was located at Facility (Appt Dept): [address] [place] [address]  Confirm you are appropriately licensed, registered, or certified to deliver care in the state where the patient is located as indicated above. If you are not or unsure, please re-schedule the visit: Yes, I confirm.     Randy Yost Jr. (:  1980) is a Established patient, presenting virtually for evaluation of the following:      Below is the assessment and plan developed based on review of pertinent history, physical exam, labs, studies, and medications.     Assessment & Plan  JAZIEL (generalized anxiety disorder)   Chronic, not at goal (unstable), increased Lexapro to 10 mg daily, consider decreasing the dose of Wellbutrin and stopping in the future.    Orders:    escitalopram (LEXAPRO) 10 MG tablet; Take 1 tablet by mouth daily    Excessive anger   Chronic, not at goal (unstable), doing better, management as above. He refuses Counseling.    Orders:    escitalopram (LEXAPRO) 10 MG tablet; Take 1 tablet by mouth daily      Return in about 1 month (around 3/11/2025) for f/u JAZIEL, anger issues- virtual.       Subjective   Anxiety  Symptoms include nervous/anxious behavior. Patient reports no chest pain, confusion, nausea, palpitations, shortness of breath or suicidal ideas.         Virtual visit for a 2 week follow up of-    JAZIEL, anger issues- we started Lexapro 5 mg about 2 weeks ago- takes at HS- denies any side effects. He also takes Wellbutrin  mg in am - works quite well. He tried

## 2025-02-11 NOTE — ASSESSMENT & PLAN NOTE
Chronic, not at goal (unstable), increased Lexapro to 10 mg daily, consider decreasing the dose of Wellbutrin and stopping in the future.    Orders:    escitalopram (LEXAPRO) 10 MG tablet; Take 1 tablet by mouth daily

## 2025-03-12 DIAGNOSIS — R45.4 EXCESSIVE ANGER: ICD-10-CM

## 2025-03-12 DIAGNOSIS — F41.1 GAD (GENERALIZED ANXIETY DISORDER): ICD-10-CM

## 2025-03-12 RX ORDER — ESCITALOPRAM OXALATE 10 MG/1
10 TABLET ORAL DAILY
Qty: 30 TABLET | Refills: 0 | OUTPATIENT
Start: 2025-03-12

## 2025-03-26 ENCOUNTER — TELEPHONE (OUTPATIENT)
Dept: FAMILY MEDICINE CLINIC | Facility: CLINIC | Age: 45
End: 2025-03-26

## 2025-03-26 DIAGNOSIS — R45.4 EXCESSIVE ANGER: ICD-10-CM

## 2025-03-26 DIAGNOSIS — F41.1 GAD (GENERALIZED ANXIETY DISORDER): ICD-10-CM

## 2025-03-26 RX ORDER — ESCITALOPRAM OXALATE 10 MG/1
10 TABLET ORAL DAILY
Qty: 30 TABLET | Refills: 0 | Status: SHIPPED | OUTPATIENT
Start: 2025-03-26 | End: 2025-04-07 | Stop reason: SDUPTHER

## 2025-03-26 NOTE — TELEPHONE ENCOUNTER
Patient requesting a refill on Lexapro 10mg.     Patients original appointment was moved to 4/7 due to Dr. Foreman being out. Patient does not have any more tablets left. Needs at least enough until his 4/7 appointment with Dr. Foreman.     Last Appt: 2/11/2025  Next Appt: 4/7/2025    Send to [facility] [address] [place] [address] [place].

## 2025-04-07 ENCOUNTER — TELEMEDICINE (OUTPATIENT)
Dept: FAMILY MEDICINE CLINIC | Facility: CLINIC | Age: 45
End: 2025-04-07
Payer: COMMERCIAL

## 2025-04-07 DIAGNOSIS — F41.1 GAD (GENERALIZED ANXIETY DISORDER): Primary | Chronic | ICD-10-CM

## 2025-04-07 DIAGNOSIS — R45.4 EXCESSIVE ANGER: Chronic | ICD-10-CM

## 2025-04-07 PROCEDURE — 99214 OFFICE O/P EST MOD 30 MIN: CPT | Performed by: FAMILY MEDICINE

## 2025-04-07 RX ORDER — ESCITALOPRAM OXALATE 10 MG/1
10 TABLET ORAL DAILY
Qty: 30 TABLET | Refills: 1 | Status: SHIPPED | OUTPATIENT
Start: 2025-04-07

## 2025-04-07 RX ORDER — BUPROPION HYDROCHLORIDE 150 MG/1
150 TABLET ORAL EVERY MORNING
Qty: 30 TABLET | Refills: 1 | Status: SHIPPED | OUTPATIENT
Start: 2025-04-07

## 2025-04-07 RX ORDER — ESOMEPRAZOLE MAGNESIUM 20 MG/1
20 GRANULE, DELAYED RELEASE ORAL DAILY
COMMUNITY

## 2025-04-07 ASSESSMENT — PATIENT HEALTH QUESTIONNAIRE - PHQ9
1. LITTLE INTEREST OR PLEASURE IN DOING THINGS: NOT AT ALL
SUM OF ALL RESPONSES TO PHQ QUESTIONS 1-9: 0
2. FEELING DOWN, DEPRESSED OR HOPELESS: NOT AT ALL
SUM OF ALL RESPONSES TO PHQ QUESTIONS 1-9: 0

## 2025-04-07 NOTE — PROGRESS NOTES
Randy Yost Jr., was evaluated through a synchronous (real-time) audio-video encounter. The patient (or guardian if applicable) is aware that this is a billable service, which includes applicable co-pays. This Virtual Visit was conducted with patient's (and/or legal guardian's) consent. Patient identification was verified, and a caregiver was present when appropriate.   The patient was located at Other: office  Provider was located at Facility (Appt Dept): [address] [place] [id number]  Confirm you are appropriately licensed, registered, or certified to deliver care in the state where the patient is located as indicated above. If you are not or unsure, please re-schedule the visit: Yes, I confirm.     Randy Yost Jr. (:  1980) is a Established patient, presenting virtually for evaluation of the following:      Below is the assessment and plan developed based on review of pertinent history, physical exam, labs, studies, and medications.     Assessment & Plan  JAZIEL (generalized anxiety disorder)   Chronic, at goal (stable), continue current treatment plan with Lexapro 10 mg, decreased Wellbutrin XL to 150 mg daily - to take for 1 month and then decrease to 1 tab every other day for 2 weeks and then every 3rd day for 1 week and then stop.    Orders:    buPROPion (WELLBUTRIN XL) 150 MG extended release tablet; Take 1 tablet by mouth every morning    escitalopram (LEXAPRO) 10 MG tablet; Take 1 tablet by mouth daily    Excessive anger   Chronic, at goal (stable), continue current treatment plan as above    Orders:    buPROPion (WELLBUTRIN XL) 150 MG extended release tablet; Take 1 tablet by mouth every morning    escitalopram (LEXAPRO) 10 MG tablet; Take 1 tablet by mouth daily      Return for Has an appt on 25 or prn sooner.       Subjective   HPI  Virtual visit for a 2 month follow up of-     JAZIEL, anger issues- we increased Lexapro to 10 mg about 2 months ago- takes at

## 2025-04-07 NOTE — ASSESSMENT & PLAN NOTE
Chronic, at goal (stable), continue current treatment plan with Lexapro 10 mg, decreased Wellbutrin XL to 150 mg daily - to take for 1 month and then decrease to 1 tab every other day for 2 weeks and then every 3rd day for 1 week and then stop.    Orders:    buPROPion (WELLBUTRIN XL) 150 MG extended release tablet; Take 1 tablet by mouth every morning    escitalopram (LEXAPRO) 10 MG tablet; Take 1 tablet by mouth daily

## 2025-04-07 NOTE — ASSESSMENT & PLAN NOTE
Chronic, at goal (stable), continue current treatment plan as above    Orders:    buPROPion (WELLBUTRIN XL) 150 MG extended release tablet; Take 1 tablet by mouth every morning    escitalopram (LEXAPRO) 10 MG tablet; Take 1 tablet by mouth daily

## 2025-06-01 DIAGNOSIS — F41.1 GAD (GENERALIZED ANXIETY DISORDER): Chronic | ICD-10-CM

## 2025-06-01 DIAGNOSIS — R45.4 EXCESSIVE ANGER: Chronic | ICD-10-CM

## 2025-06-02 RX ORDER — BUPROPION HYDROCHLORIDE 150 MG/1
150 TABLET ORAL EVERY MORNING
Qty: 30 TABLET | Refills: 1 | OUTPATIENT
Start: 2025-06-02

## 2025-06-19 ENCOUNTER — OFFICE VISIT (OUTPATIENT)
Dept: FAMILY MEDICINE CLINIC | Facility: CLINIC | Age: 45
End: 2025-06-19
Payer: COMMERCIAL

## 2025-06-19 VITALS
HEART RATE: 71 BPM | SYSTOLIC BLOOD PRESSURE: 138 MMHG | DIASTOLIC BLOOD PRESSURE: 88 MMHG | BODY MASS INDEX: 30.25 KG/M2 | HEIGHT: 71 IN | WEIGHT: 216.1 LBS | TEMPERATURE: 97.9 F | OXYGEN SATURATION: 95 %

## 2025-06-19 DIAGNOSIS — Z23 ENCOUNTER FOR IMMUNIZATION: Chronic | ICD-10-CM

## 2025-06-19 DIAGNOSIS — N52.01 ERECTILE DYSFUNCTION DUE TO ARTERIAL INSUFFICIENCY: Chronic | ICD-10-CM

## 2025-06-19 DIAGNOSIS — J30.1 SEASONAL ALLERGIC RHINITIS DUE TO POLLEN: Chronic | ICD-10-CM

## 2025-06-19 DIAGNOSIS — I10 ESSENTIAL HYPERTENSION: Primary | Chronic | ICD-10-CM

## 2025-06-19 DIAGNOSIS — E78.2 MIXED HYPERLIPIDEMIA: Chronic | ICD-10-CM

## 2025-06-19 DIAGNOSIS — E66.09 CLASS 1 OBESITY DUE TO EXCESS CALORIES WITH SERIOUS COMORBIDITY AND BODY MASS INDEX (BMI) OF 30.0 TO 30.9 IN ADULT: Chronic | ICD-10-CM

## 2025-06-19 DIAGNOSIS — E66.811 CLASS 1 OBESITY DUE TO EXCESS CALORIES WITH SERIOUS COMORBIDITY AND BODY MASS INDEX (BMI) OF 30.0 TO 30.9 IN ADULT: Chronic | ICD-10-CM

## 2025-06-19 DIAGNOSIS — R45.4 EXCESSIVE ANGER: Chronic | ICD-10-CM

## 2025-06-19 DIAGNOSIS — F41.1 GAD (GENERALIZED ANXIETY DISORDER): Chronic | ICD-10-CM

## 2025-06-19 PROCEDURE — 99214 OFFICE O/P EST MOD 30 MIN: CPT | Performed by: FAMILY MEDICINE

## 2025-06-19 PROCEDURE — 90471 IMMUNIZATION ADMIN: CPT | Performed by: FAMILY MEDICINE

## 2025-06-19 PROCEDURE — 90715 TDAP VACCINE 7 YRS/> IM: CPT | Performed by: FAMILY MEDICINE

## 2025-06-19 PROCEDURE — 3075F SYST BP GE 130 - 139MM HG: CPT | Performed by: FAMILY MEDICINE

## 2025-06-19 PROCEDURE — 3079F DIAST BP 80-89 MM HG: CPT | Performed by: FAMILY MEDICINE

## 2025-06-19 RX ORDER — BUPROPION HYDROCHLORIDE 300 MG/1
300 TABLET ORAL EVERY MORNING
Qty: 90 TABLET | Refills: 1 | Status: SHIPPED | OUTPATIENT
Start: 2025-06-19

## 2025-06-19 RX ORDER — FLUTICASONE PROPIONATE 50 MCG
2 SPRAY, SUSPENSION (ML) NASAL DAILY
Qty: 3 EACH | Refills: 1 | Status: SHIPPED | OUTPATIENT
Start: 2025-06-19

## 2025-06-19 RX ORDER — TADALAFIL 5 MG/1
5 TABLET ORAL PRN
Qty: 10 TABLET | Refills: 5 | Status: CANCELLED | OUTPATIENT
Start: 2025-06-19

## 2025-06-19 RX ORDER — OMEPRAZOLE 20 MG/1
20 CAPSULE, DELAYED RELEASE ORAL DAILY
Qty: 30 CAPSULE | Status: CANCELLED | OUTPATIENT
Start: 2025-06-19

## 2025-06-19 RX ORDER — OMEPRAZOLE 20 MG/1
20 CAPSULE, DELAYED RELEASE ORAL DAILY
COMMUNITY

## 2025-06-19 RX ORDER — BUPROPION HYDROCHLORIDE 150 MG/1
150 TABLET ORAL EVERY MORNING
Qty: 30 TABLET | Refills: 1 | Status: CANCELLED | OUTPATIENT
Start: 2025-06-19

## 2025-06-19 RX ORDER — ESCITALOPRAM OXALATE 10 MG/1
10 TABLET ORAL DAILY
Qty: 90 TABLET | Refills: 1 | Status: SHIPPED | OUTPATIENT
Start: 2025-06-19

## 2025-06-19 RX ORDER — PRAVASTATIN SODIUM 10 MG
10 TABLET ORAL DAILY
Qty: 90 TABLET | Refills: 1 | Status: SHIPPED | OUTPATIENT
Start: 2025-06-19

## 2025-06-19 RX ORDER — CANDESARTAN 16 MG/1
16 TABLET ORAL DAILY
Qty: 90 TABLET | Refills: 1 | Status: SHIPPED | OUTPATIENT
Start: 2025-06-19

## 2025-06-19 NOTE — PROGRESS NOTES
Randy Yost Jr. (:  1980) is a 44 y.o. male,Established patient, here for evaluation of the following chief complaint(s):  Medication Refill (Patient is taking Prilosec would like if the provider would prescribe it for him)         Assessment & Plan  Essential hypertension   Chronic, at goal (stable), continue current treatment plan    Orders:    candesartan (ATACAND) 16 MG tablet; Take 1 tablet by mouth daily    CBC with Auto Differential; Future    Comprehensive Metabolic Panel; Future    Mixed hyperlipidemia   Chronic, not at goal (unstable), continue current treatment plan, await labs.    Orders:    pravastatin (PRAVACHOL) 10 MG tablet; Take 1 tablet by mouth daily    Comprehensive Metabolic Panel; Future    Lipid Panel; Future    Seasonal allergic rhinitis due to pollen   Chronic, not at goal (unstable), continue current treatment plan  Advised to do steam inhalations or the kameron pot or saline rinses.  Orders:    fluticasone (FLONASE) 50 MCG/ACT nasal spray; 2 sprays by Nasal route daily USE 1 SPRAY IN BOTH NOSTRILS ONCE A DAY    JAZIEL (generalized anxiety disorder)   Chronic, at goal (stable), continue current treatment plan  Increased Wellbutrin XL to 300 mg per patient's request.  Orders:    escitalopram (LEXAPRO) 10 MG tablet; Take 1 tablet by mouth daily    buPROPion (WELLBUTRIN XL) 300 MG extended release tablet; Take 1 tablet by mouth every morning    Excessive anger   Chronic, at goal (stable), see above    Orders:    escitalopram (LEXAPRO) 10 MG tablet; Take 1 tablet by mouth daily    buPROPion (WELLBUTRIN XL) 300 MG extended release tablet; Take 1 tablet by mouth every morning    Erectile dysfunction due to arterial insufficiency   Chronic, at goal (stable), continue current treatment plan         Class 1 obesity due to excess calories with serious comorbidity and body mass index (BMI) of 30.0 to 30.9 in adult   Chronic, worsening (exacerbation), advised to lose weight and lifestyle

## 2025-06-19 NOTE — ASSESSMENT & PLAN NOTE
Chronic, not at goal (unstable), continue current treatment plan, await labs.    Orders:    pravastatin (PRAVACHOL) 10 MG tablet; Take 1 tablet by mouth daily    Comprehensive Metabolic Panel; Future    Lipid Panel; Future

## 2025-06-19 NOTE — ASSESSMENT & PLAN NOTE
Chronic, not at goal (unstable), continue current treatment plan  Advised to do steam inhalations or the kameron pot or saline rinses.  Orders:    fluticasone (FLONASE) 50 MCG/ACT nasal spray; 2 sprays by Nasal route daily USE 1 SPRAY IN BOTH NOSTRILS ONCE A DAY

## 2025-06-19 NOTE — ASSESSMENT & PLAN NOTE
Chronic, at goal (stable), see above    Orders:    escitalopram (LEXAPRO) 10 MG tablet; Take 1 tablet by mouth daily    buPROPion (WELLBUTRIN XL) 300 MG extended release tablet; Take 1 tablet by mouth every morning

## 2025-06-19 NOTE — ASSESSMENT & PLAN NOTE
Chronic, worsening (exacerbation), advised to lose weight and lifestyle modifications recommended       Advised to take the Hep B vaccines at the pharmacy, refuses the Covid vaccine.

## 2025-06-19 NOTE — ASSESSMENT & PLAN NOTE
Chronic, at goal (stable), continue current treatment plan  Increased Wellbutrin XL to 300 mg per patient's request.  Orders:    escitalopram (LEXAPRO) 10 MG tablet; Take 1 tablet by mouth daily    buPROPion (WELLBUTRIN XL) 300 MG extended release tablet; Take 1 tablet by mouth every morning